# Patient Record
Sex: FEMALE | Race: WHITE | NOT HISPANIC OR LATINO
[De-identification: names, ages, dates, MRNs, and addresses within clinical notes are randomized per-mention and may not be internally consistent; named-entity substitution may affect disease eponyms.]

---

## 2017-10-01 ENCOUNTER — TRANSCRIPTION ENCOUNTER (OUTPATIENT)
Age: 42
End: 2017-10-01

## 2017-10-20 ENCOUNTER — OUTPATIENT (OUTPATIENT)
Dept: OUTPATIENT SERVICES | Facility: HOSPITAL | Age: 42
LOS: 1 days | End: 2017-10-20
Payer: COMMERCIAL

## 2017-10-20 ENCOUNTER — APPOINTMENT (OUTPATIENT)
Dept: OBGYN | Facility: CLINIC | Age: 42
End: 2017-10-20
Payer: COMMERCIAL

## 2017-10-20 VITALS
DIASTOLIC BLOOD PRESSURE: 68 MMHG | HEIGHT: 59 IN | RESPIRATION RATE: 20 BRPM | WEIGHT: 131.62 LBS | OXYGEN SATURATION: 100 % | HEART RATE: 82 BPM | TEMPERATURE: 98 F | SYSTOLIC BLOOD PRESSURE: 96 MMHG

## 2017-10-20 DIAGNOSIS — O02.1 MISSED ABORTION: ICD-10-CM

## 2017-10-20 DIAGNOSIS — Z01.818 ENCOUNTER FOR OTHER PREPROCEDURAL EXAMINATION: ICD-10-CM

## 2017-10-20 LAB
BLD GP AB SCN SERPL QL: NEGATIVE — SIGNIFICANT CHANGE UP
HCT VFR BLD CALC: 34.4 % — LOW (ref 34.5–45)
HGB BLD-MCNC: 11.6 G/DL — SIGNIFICANT CHANGE UP (ref 11.5–15.5)
MCHC RBC-ENTMCNC: 29.9 PG — SIGNIFICANT CHANGE UP (ref 27–34)
MCHC RBC-ENTMCNC: 33.7 GM/DL — SIGNIFICANT CHANGE UP (ref 32–36)
MCV RBC AUTO: 88.7 FL — SIGNIFICANT CHANGE UP (ref 80–100)
PLATELET # BLD AUTO: 233 K/UL — SIGNIFICANT CHANGE UP (ref 150–400)
RBC # BLD: 3.88 M/UL — SIGNIFICANT CHANGE UP (ref 3.8–5.2)
RBC # FLD: 12.5 % — SIGNIFICANT CHANGE UP (ref 10.3–14.5)
RH IG SCN BLD-IMP: POSITIVE — SIGNIFICANT CHANGE UP
WBC # BLD: 7.77 K/UL — SIGNIFICANT CHANGE UP (ref 3.8–10.5)
WBC # FLD AUTO: 7.77 K/UL — SIGNIFICANT CHANGE UP (ref 3.8–10.5)

## 2017-10-20 PROCEDURE — 86900 BLOOD TYPING SEROLOGIC ABO: CPT

## 2017-10-20 PROCEDURE — G0463: CPT

## 2017-10-20 PROCEDURE — 86850 RBC ANTIBODY SCREEN: CPT

## 2017-10-20 PROCEDURE — 99214 OFFICE O/P EST MOD 30 MIN: CPT

## 2017-10-20 PROCEDURE — 86901 BLOOD TYPING SEROLOGIC RH(D): CPT

## 2017-10-20 PROCEDURE — 85027 COMPLETE CBC AUTOMATED: CPT

## 2017-10-20 RX ORDER — ACETAMINOPHEN 500 MG
975 TABLET ORAL ONCE
Qty: 0 | Refills: 0 | Status: COMPLETED | OUTPATIENT
Start: 2017-10-23 | End: 2017-10-23

## 2017-10-20 RX ORDER — SODIUM CHLORIDE 9 MG/ML
3 INJECTION INTRAMUSCULAR; INTRAVENOUS; SUBCUTANEOUS EVERY 8 HOURS
Qty: 0 | Refills: 0 | Status: DISCONTINUED | OUTPATIENT
Start: 2017-10-23 | End: 2017-11-10

## 2017-10-20 RX ORDER — LIDOCAINE HCL 20 MG/ML
0.2 VIAL (ML) INJECTION ONCE
Qty: 0 | Refills: 0 | Status: DISCONTINUED | OUTPATIENT
Start: 2017-10-23 | End: 2017-11-10

## 2017-10-20 NOTE — H&P PST ADULT - RS GEN PE MLT RESP DETAILS PC
respirations non-labored/breath sounds equal/normal/airway patent/good air movement/clear to auscultation bilaterally

## 2017-10-20 NOTE — H&P PST ADULT - NSANTHOSAYNRD_GEN_A_CORE
No. MALI screening performed.  STOP BANG Legend: 0-2 = LOW Risk; 3-4 = INTERMEDIATE Risk; 5-8 = HIGH Risk

## 2017-10-23 ENCOUNTER — TRANSCRIPTION ENCOUNTER (OUTPATIENT)
Age: 42
End: 2017-10-23

## 2017-10-23 ENCOUNTER — OUTPATIENT (OUTPATIENT)
Dept: OUTPATIENT SERVICES | Facility: HOSPITAL | Age: 42
LOS: 1 days | End: 2017-10-23
Payer: COMMERCIAL

## 2017-10-23 ENCOUNTER — RESULT REVIEW (OUTPATIENT)
Age: 42
End: 2017-10-23

## 2017-10-23 VITALS
RESPIRATION RATE: 20 BRPM | SYSTOLIC BLOOD PRESSURE: 90 MMHG | HEART RATE: 74 BPM | OXYGEN SATURATION: 100 % | DIASTOLIC BLOOD PRESSURE: 62 MMHG | TEMPERATURE: 99 F | WEIGHT: 131.62 LBS | HEIGHT: 59 IN

## 2017-10-23 VITALS
OXYGEN SATURATION: 100 % | SYSTOLIC BLOOD PRESSURE: 92 MMHG | TEMPERATURE: 98 F | HEART RATE: 79 BPM | RESPIRATION RATE: 16 BRPM | DIASTOLIC BLOOD PRESSURE: 58 MMHG

## 2017-10-23 DIAGNOSIS — O02.1 MISSED ABORTION: ICD-10-CM

## 2017-10-23 DIAGNOSIS — Z01.818 ENCOUNTER FOR OTHER PREPROCEDURAL EXAMINATION: ICD-10-CM

## 2017-10-23 PROCEDURE — 88291 CYTO/MOLECULAR REPORT: CPT

## 2017-10-23 PROCEDURE — 59820 CARE OF MISCARRIAGE: CPT

## 2017-10-23 PROCEDURE — 88233 TISSUE CULTURE SKIN/BIOPSY: CPT

## 2017-10-23 PROCEDURE — 88305 TISSUE EXAM BY PATHOLOGIST: CPT

## 2017-10-23 PROCEDURE — 88264 CHROMOSOME ANALYSIS 20-25: CPT

## 2017-10-23 PROCEDURE — 88280 CHROMOSOME KARYOTYPE STUDY: CPT

## 2017-10-23 PROCEDURE — 88305 TISSUE EXAM BY PATHOLOGIST: CPT | Mod: 26

## 2017-10-23 RX ORDER — CELECOXIB 200 MG/1
200 CAPSULE ORAL ONCE
Qty: 0 | Refills: 0 | Status: COMPLETED | OUTPATIENT
Start: 2017-10-23 | End: 2017-10-23

## 2017-10-23 RX ORDER — SODIUM CHLORIDE 9 MG/ML
1000 INJECTION, SOLUTION INTRAVENOUS
Qty: 0 | Refills: 0 | Status: DISCONTINUED | OUTPATIENT
Start: 2017-10-23 | End: 2017-11-10

## 2017-10-23 RX ORDER — FAMOTIDINE 10 MG/ML
1 INJECTION INTRAVENOUS
Qty: 0 | Refills: 0 | COMMUNITY

## 2017-10-23 RX ORDER — OXYCODONE HYDROCHLORIDE 5 MG/1
5 TABLET ORAL ONCE
Qty: 0 | Refills: 0 | Status: DISCONTINUED | OUTPATIENT
Start: 2017-10-23 | End: 2017-10-23

## 2017-10-23 RX ORDER — ONDANSETRON 8 MG/1
4 TABLET, FILM COATED ORAL ONCE
Qty: 0 | Refills: 0 | Status: DISCONTINUED | OUTPATIENT
Start: 2017-10-23 | End: 2017-11-10

## 2017-10-23 RX ADMIN — CELECOXIB 200 MILLIGRAM(S): 200 CAPSULE ORAL at 09:24

## 2017-10-23 RX ADMIN — Medication 975 MILLIGRAM(S): at 09:24

## 2017-10-23 RX ADMIN — CELECOXIB 200 MILLIGRAM(S): 200 CAPSULE ORAL at 12:05

## 2017-10-23 NOTE — ASU DISCHARGE PLAN (ADULT/PEDIATRIC). - NOTIFY
Inability to Tolerate Liquids or Foods/Swelling that continues/Bleeding that does not stop/Numbness, color, or temperature change to extremity/Persistent Nausea and Vomiting/GYN Fever>100.4/Numbness, tingling/Unable to Urinate/Pain not relieved by Medications/Excessive Diarrhea/Increased Irritability or Sluggishness

## 2017-10-23 NOTE — ASU DISCHARGE PLAN (ADULT/PEDIATRIC). - MEDICATION SUMMARY - MEDICATIONS TO TAKE
I will START or STAY ON the medications listed below when I get home from the hospital:    Prenatal Multivitamins with Folic Acid 0.2 mg oral tablet  -- 1 tab(s) by mouth once a day  -- Indication: For vitamin    Synthroid 50 mcg (0.05 mg) oral tablet  -- 1 tab(s) by mouth 5 times a week  -- Indication: For hypothyroidism    Synthroid 100 mcg (0.1 mg) oral tablet  -- 1 tab(s) by mouth 2 times a week  -- Indication: For hypothyroidism    Vitamin D3 1000 intl units oral tablet  -- 1 tab(s) by mouth once a day  -- Indication: For vitamin

## 2017-10-23 NOTE — BRIEF OPERATIVE NOTE - PROCEDURE
<<-----Click on this checkbox to enter Procedure Dilation and curettage  10/23/2017    Active  ILBERT

## 2017-10-23 NOTE — ASU DISCHARGE PLAN (ADULT/PEDIATRIC). - MEDICATION SUMMARY - MEDICATIONS TO STOP TAKING
I will STOP taking the medications listed below when I get home from the hospital:    famotidine 20 mg oral tablet  -- 1 tab(s) by mouth once, 1 tab night prior to surgery, 1 tab day of surgery

## 2017-10-24 ENCOUNTER — APPOINTMENT (OUTPATIENT)
Dept: OBGYN | Facility: CLINIC | Age: 42
End: 2017-10-24

## 2017-10-24 LAB — SURGICAL PATHOLOGY STUDY: SIGNIFICANT CHANGE UP

## 2017-11-01 ENCOUNTER — APPOINTMENT (OUTPATIENT)
Dept: OBGYN | Facility: CLINIC | Age: 42
End: 2017-11-01
Payer: COMMERCIAL

## 2017-11-01 PROCEDURE — 99024 POSTOP FOLLOW-UP VISIT: CPT

## 2017-11-10 LAB — CHROM ANALY OVERALL INTERP SPEC-IMP: SIGNIFICANT CHANGE UP

## 2017-11-27 ENCOUNTER — APPOINTMENT (OUTPATIENT)
Dept: OBGYN | Facility: CLINIC | Age: 42
End: 2017-11-27
Payer: COMMERCIAL

## 2017-11-27 PROCEDURE — 96372 THER/PROPH/DIAG INJ SC/IM: CPT

## 2017-12-04 ENCOUNTER — APPOINTMENT (OUTPATIENT)
Dept: OBGYN | Facility: CLINIC | Age: 42
End: 2017-12-04
Payer: COMMERCIAL

## 2017-12-04 PROCEDURE — 36415 COLL VENOUS BLD VENIPUNCTURE: CPT

## 2017-12-09 NOTE — H&P PST ADULT - BSA (M2)
Discharge instructions reviewed with patient and mom. Prescription provided. All questions answered to patient's satisfaction. 1.54

## 2017-12-13 ENCOUNTER — RESULT REVIEW (OUTPATIENT)
Age: 42
End: 2017-12-13

## 2017-12-14 ENCOUNTER — APPOINTMENT (OUTPATIENT)
Dept: OBGYN | Facility: CLINIC | Age: 42
End: 2017-12-14
Payer: COMMERCIAL

## 2017-12-14 PROCEDURE — 99396 PREV VISIT EST AGE 40-64: CPT | Mod: 24

## 2017-12-14 PROCEDURE — 36415 COLL VENOUS BLD VENIPUNCTURE: CPT

## 2017-12-24 ENCOUNTER — EMERGENCY (EMERGENCY)
Facility: HOSPITAL | Age: 42
LOS: 1 days | Discharge: HOME | End: 2017-12-24
Attending: EMERGENCY MEDICINE | Admitting: EMERGENCY MEDICINE
Payer: COMMERCIAL

## 2017-12-24 VITALS
DIASTOLIC BLOOD PRESSURE: 76 MMHG | SYSTOLIC BLOOD PRESSURE: 108 MMHG | RESPIRATION RATE: 20 BRPM | HEART RATE: 101 BPM | TEMPERATURE: 98 F

## 2017-12-24 VITALS
RESPIRATION RATE: 16 BRPM | SYSTOLIC BLOOD PRESSURE: 90 MMHG | TEMPERATURE: 98 F | OXYGEN SATURATION: 100 % | DIASTOLIC BLOOD PRESSURE: 55 MMHG

## 2017-12-24 LAB
ALBUMIN SERPL ELPH-MCNC: 4.5 G/DL — SIGNIFICANT CHANGE UP (ref 3.3–5)
ALP SERPL-CCNC: 46 U/L — SIGNIFICANT CHANGE UP (ref 40–120)
ALT FLD-CCNC: 17 U/L RC — SIGNIFICANT CHANGE UP (ref 10–45)
ANION GAP SERPL CALC-SCNC: 11 MMOL/L — SIGNIFICANT CHANGE UP (ref 5–17)
APPEARANCE UR: CLEAR — SIGNIFICANT CHANGE UP
APTT BLD: 26.8 SEC — LOW (ref 27.5–37.4)
AST SERPL-CCNC: 19 U/L — SIGNIFICANT CHANGE UP (ref 10–40)
BACTERIA # UR AUTO: ABNORMAL /HPF
BASE EXCESS BLDV CALC-SCNC: -0.4 MMOL/L — SIGNIFICANT CHANGE UP (ref -2–2)
BASOPHILS # BLD AUTO: 0 K/UL — SIGNIFICANT CHANGE UP (ref 0–0.2)
BASOPHILS NFR BLD AUTO: 0.3 % — SIGNIFICANT CHANGE UP (ref 0–2)
BILIRUB SERPL-MCNC: 0.5 MG/DL — SIGNIFICANT CHANGE UP (ref 0.2–1.2)
BILIRUB UR-MCNC: NEGATIVE — SIGNIFICANT CHANGE UP
BUN SERPL-MCNC: 13 MG/DL — SIGNIFICANT CHANGE UP (ref 7–23)
CA-I SERPL-SCNC: 1.22 MMOL/L — SIGNIFICANT CHANGE UP (ref 1.12–1.3)
CALCIUM SERPL-MCNC: 9.1 MG/DL — SIGNIFICANT CHANGE UP (ref 8.4–10.5)
CHLORIDE BLDV-SCNC: 107 MMOL/L — SIGNIFICANT CHANGE UP (ref 96–108)
CHLORIDE SERPL-SCNC: 105 MMOL/L — SIGNIFICANT CHANGE UP (ref 96–108)
CO2 BLDV-SCNC: 26 MMOL/L — SIGNIFICANT CHANGE UP (ref 22–30)
CO2 SERPL-SCNC: 25 MMOL/L — SIGNIFICANT CHANGE UP (ref 22–31)
COLOR SPEC: YELLOW — SIGNIFICANT CHANGE UP
CREAT SERPL-MCNC: 0.67 MG/DL — SIGNIFICANT CHANGE UP (ref 0.5–1.3)
DIFF PNL FLD: ABNORMAL
EOSINOPHIL # BLD AUTO: 0.1 K/UL — SIGNIFICANT CHANGE UP (ref 0–0.5)
EOSINOPHIL NFR BLD AUTO: 0.6 % — SIGNIFICANT CHANGE UP (ref 0–6)
EPI CELLS # UR: SIGNIFICANT CHANGE UP /HPF
GAS PNL BLDV: 138 MMOL/L — SIGNIFICANT CHANGE UP (ref 136–145)
GAS PNL BLDV: SIGNIFICANT CHANGE UP
GAS PNL BLDV: SIGNIFICANT CHANGE UP
GLUCOSE BLDV-MCNC: 98 MG/DL — SIGNIFICANT CHANGE UP (ref 70–99)
GLUCOSE SERPL-MCNC: 101 MG/DL — HIGH (ref 70–99)
GLUCOSE UR QL: NEGATIVE — SIGNIFICANT CHANGE UP
HCO3 BLDV-SCNC: 25 MMOL/L — SIGNIFICANT CHANGE UP (ref 21–29)
HCT VFR BLD CALC: 39 % — SIGNIFICANT CHANGE UP (ref 34.5–45)
HCT VFR BLDA CALC: 40 % — SIGNIFICANT CHANGE UP (ref 39–50)
HGB BLD CALC-MCNC: 13.1 G/DL — SIGNIFICANT CHANGE UP (ref 11.5–15.5)
HGB BLD-MCNC: 13.5 G/DL — SIGNIFICANT CHANGE UP (ref 11.5–15.5)
INR BLD: 0.96 RATIO — SIGNIFICANT CHANGE UP (ref 0.88–1.16)
KETONES UR-MCNC: NEGATIVE — SIGNIFICANT CHANGE UP
LACTATE BLDV-MCNC: 1.3 MMOL/L — SIGNIFICANT CHANGE UP (ref 0.7–2)
LEUKOCYTE ESTERASE UR-ACNC: NEGATIVE — SIGNIFICANT CHANGE UP
LYMPHOCYTES # BLD AUTO: 1.5 K/UL — SIGNIFICANT CHANGE UP (ref 1–3.3)
LYMPHOCYTES # BLD AUTO: 14.6 % — SIGNIFICANT CHANGE UP (ref 13–44)
MCHC RBC-ENTMCNC: 32.1 PG — SIGNIFICANT CHANGE UP (ref 27–34)
MCHC RBC-ENTMCNC: 34.6 GM/DL — SIGNIFICANT CHANGE UP (ref 32–36)
MCV RBC AUTO: 92.9 FL — SIGNIFICANT CHANGE UP (ref 80–100)
MONOCYTES # BLD AUTO: 0.7 K/UL — SIGNIFICANT CHANGE UP (ref 0–0.9)
MONOCYTES NFR BLD AUTO: 7.2 % — SIGNIFICANT CHANGE UP (ref 2–14)
NEUTROPHILS # BLD AUTO: 8 K/UL — HIGH (ref 1.8–7.4)
NEUTROPHILS NFR BLD AUTO: 77.3 % — HIGH (ref 43–77)
NITRITE UR-MCNC: NEGATIVE — SIGNIFICANT CHANGE UP
PCO2 BLDV: 47 MMHG — SIGNIFICANT CHANGE UP (ref 35–50)
PH BLDV: 7.35 — SIGNIFICANT CHANGE UP (ref 7.35–7.45)
PH UR: 7 — SIGNIFICANT CHANGE UP (ref 5–8)
PLATELET # BLD AUTO: 220 K/UL — SIGNIFICANT CHANGE UP (ref 150–400)
PO2 BLDV: 39 MMHG — SIGNIFICANT CHANGE UP (ref 25–45)
POTASSIUM BLDV-SCNC: 3.7 MMOL/L — SIGNIFICANT CHANGE UP (ref 3.5–5)
POTASSIUM SERPL-MCNC: 4 MMOL/L — SIGNIFICANT CHANGE UP (ref 3.5–5.3)
POTASSIUM SERPL-SCNC: 4 MMOL/L — SIGNIFICANT CHANGE UP (ref 3.5–5.3)
PROT SERPL-MCNC: 7.2 G/DL — SIGNIFICANT CHANGE UP (ref 6–8.3)
PROT UR-MCNC: NEGATIVE — SIGNIFICANT CHANGE UP
PROTHROM AB SERPL-ACNC: 10.4 SEC — SIGNIFICANT CHANGE UP (ref 9.8–12.7)
RBC # BLD: 4.2 M/UL — SIGNIFICANT CHANGE UP (ref 3.8–5.2)
RBC # FLD: 11.5 % — SIGNIFICANT CHANGE UP (ref 10.3–14.5)
SAO2 % BLDV: 69 % — SIGNIFICANT CHANGE UP (ref 67–88)
SODIUM SERPL-SCNC: 141 MMOL/L — SIGNIFICANT CHANGE UP (ref 135–145)
SP GR SPEC: 1.02 — SIGNIFICANT CHANGE UP (ref 1.01–1.02)
UROBILINOGEN FLD QL: NEGATIVE — SIGNIFICANT CHANGE UP
WBC # BLD: 10.3 K/UL — SIGNIFICANT CHANGE UP (ref 3.8–10.5)
WBC # FLD AUTO: 10.3 K/UL — SIGNIFICANT CHANGE UP (ref 3.8–10.5)
WBC UR QL: SIGNIFICANT CHANGE UP /HPF (ref 0–5)

## 2017-12-24 PROCEDURE — 82947 ASSAY GLUCOSE BLOOD QUANT: CPT

## 2017-12-24 PROCEDURE — 74177 CT ABD & PELVIS W/CONTRAST: CPT | Mod: 26

## 2017-12-24 PROCEDURE — 85027 COMPLETE CBC AUTOMATED: CPT

## 2017-12-24 PROCEDURE — 99284 EMERGENCY DEPT VISIT MOD MDM: CPT | Mod: 25

## 2017-12-24 PROCEDURE — 84295 ASSAY OF SERUM SODIUM: CPT

## 2017-12-24 PROCEDURE — 80053 COMPREHEN METABOLIC PANEL: CPT

## 2017-12-24 PROCEDURE — 85730 THROMBOPLASTIN TIME PARTIAL: CPT

## 2017-12-24 PROCEDURE — 74177 CT ABD & PELVIS W/CONTRAST: CPT

## 2017-12-24 PROCEDURE — 96374 THER/PROPH/DIAG INJ IV PUSH: CPT | Mod: XU

## 2017-12-24 PROCEDURE — 87086 URINE CULTURE/COLONY COUNT: CPT

## 2017-12-24 PROCEDURE — 82330 ASSAY OF CALCIUM: CPT

## 2017-12-24 PROCEDURE — 82435 ASSAY OF BLOOD CHLORIDE: CPT

## 2017-12-24 PROCEDURE — 84132 ASSAY OF SERUM POTASSIUM: CPT

## 2017-12-24 PROCEDURE — 83605 ASSAY OF LACTIC ACID: CPT

## 2017-12-24 PROCEDURE — 85610 PROTHROMBIN TIME: CPT

## 2017-12-24 PROCEDURE — 81001 URINALYSIS AUTO W/SCOPE: CPT

## 2017-12-24 PROCEDURE — 99285 EMERGENCY DEPT VISIT HI MDM: CPT | Mod: 25

## 2017-12-24 PROCEDURE — 82803 BLOOD GASES ANY COMBINATION: CPT

## 2017-12-24 PROCEDURE — 85014 HEMATOCRIT: CPT

## 2017-12-24 RX ORDER — SODIUM CHLORIDE 9 MG/ML
1000 INJECTION INTRAMUSCULAR; INTRAVENOUS; SUBCUTANEOUS ONCE
Qty: 0 | Refills: 0 | Status: COMPLETED | OUTPATIENT
Start: 2017-12-24 | End: 2017-12-24

## 2017-12-24 RX ORDER — ACETAMINOPHEN 500 MG
1000 TABLET ORAL ONCE
Qty: 0 | Refills: 0 | Status: COMPLETED | OUTPATIENT
Start: 2017-12-24 | End: 2017-12-24

## 2017-12-24 RX ADMIN — Medication 400 MILLIGRAM(S): at 09:00

## 2017-12-24 RX ADMIN — SODIUM CHLORIDE 1000 MILLILITER(S): 9 INJECTION INTRAMUSCULAR; INTRAVENOUS; SUBCUTANEOUS at 09:00

## 2017-12-24 RX ADMIN — Medication 1000 MILLIGRAM(S): at 09:30

## 2017-12-24 NOTE — ED PROVIDER NOTE - GASTROINTESTINAL NEGATIVE STATEMENT, MLM
no abdominal pain, + pelvic cramping, no bloating, no constipation, no diarrhea, no nausea and no vomiting.

## 2017-12-24 NOTE — ED ADULT NURSE REASSESSMENT NOTE - NS ED NURSE REASSESS COMMENT FT1
patient is refusing CT and refuses to drink oral contrast. Patient educated regarding CT exam, risks and benefits of procedure. MD Lozano made aware.
Patient reported 7/10 left suprapubic abdominal pain, pt given IV tylenol and reports 2/10 pain.

## 2017-12-24 NOTE — ED PROVIDER NOTE - PHYSICAL EXAMINATION
Gen. no acute distress, Non toxic   HEENT: EOMI, mmm,   Lungs: CTAB/L no C/ W /R   CVS: S1S2   Abd; Soft diffusely tender, more tender on LLQ. no guarding, non distended   Ext: no edema   Neuro AAOx3 non focal clear speech

## 2017-12-24 NOTE — ED ADULT NURSE NOTE - CHPI ED SYMPTOMS NEG
no blood in stool/no diarrhea/no burning urination/no nausea/no vomiting/no fever/no dysuria/no abdominal distension/no chills

## 2017-12-24 NOTE — ED PROVIDER NOTE - OBJECTIVE STATEMENT
43 y/o f with pmhx thyroid dis currently getting fertility tx presents for abd pain described as cramping lower abd pan. began yesterday. Worsening thru the day. no fever. no chills. pain is episodic, lasting minutes. resolves on its own. no back pain. pain more on left side. no associated hematuria. + diarrhea /soft bm. no dysuria. no cough. no chills.

## 2017-12-24 NOTE — ED PROVIDER NOTE - ATTENDING CONTRIBUTION TO CARE
41 y/o f with pmhx thyroid dis currently getting fertility tx presents for abd pain described as cramping lower abd pan. began yesterday. Worsening thru the day. no fever. no chills. pain is episodic, lasting minutes. resolves on its own. no back pain. pain more on left side. no associated hematuria. + diarrhea /soft bm. no dysuria. no cough. no chills.   Gen. no acute distress, Non toxic   HEENT: EOMI, mmm,   Lungs: CTAB/L no C/ W /R   CVS: S1S2   Abd; Soft diffusely tender, more tender on LLQ. no guarding, non distended   Ext: no edema   Neuro AAOx3 non focal clear speech Gen. no acute distress, Non toxic   HEENT: EOMI, mmm,   Lungs: CTAB/L no C/ W /R   CVS: S1S2   Abd; Soft diffusely tender, more tender on LLQ. no guarding, non distended   Ext: no edema   Neuro AAOx3 non focal clear speech

## 2017-12-24 NOTE — ED ADULT NURSE NOTE - OBJECTIVE STATEMENT
41 YO  female with no PMH as per patient, but sp DNC 10/23 via walk in presenting with   for amount of time as per .    Pt denies urinary symptoms. Last BM:     .   Pt Axox4, gross neuro intact, PERRL mm. Lungs clear throughout bilateral. S1S2 heard. Abdomen soft, non-tender, non-distended. Skin warm, dry, and intact. Safety and comfort measures maintained. family present at bedside. 41 YO  female with no PMH as per patient, but sp DNC 10/23 via walk in presenting with left suprapubic abdominal pain that radiates to the back that started yesterday. Pt reports sitting worsens the pain and walking relieves the pain. Pt reports recent DNC on 10/23/2017, no menstrual period since, however pt reports that patient was ovulated and expecting period soon.   Pt Axox4, gross neuro intact, PERRL 4 mm. Lungs clear throughout bilateral. S1S2 heard. Abdomen soft, non-tender, non-distended. Skin warm, dry, and intact. Safety and comfort measures maintained. family present at bedside.

## 2017-12-25 LAB
CULTURE RESULTS: SIGNIFICANT CHANGE UP
SPECIMEN SOURCE: SIGNIFICANT CHANGE UP

## 2018-05-21 ENCOUNTER — APPOINTMENT (OUTPATIENT)
Dept: OBGYN | Facility: CLINIC | Age: 43
End: 2018-05-21
Payer: COMMERCIAL

## 2018-05-21 PROCEDURE — 76817 TRANSVAGINAL US OBSTETRIC: CPT

## 2018-05-21 PROCEDURE — 0500F INITIAL PRENATAL CARE VISIT: CPT

## 2018-05-21 PROCEDURE — 36415 COLL VENOUS BLD VENIPUNCTURE: CPT

## 2018-06-04 ENCOUNTER — APPOINTMENT (OUTPATIENT)
Dept: OBGYN | Facility: CLINIC | Age: 43
End: 2018-06-04
Payer: COMMERCIAL

## 2018-06-04 PROCEDURE — 0502F SUBSEQUENT PRENATAL CARE: CPT

## 2018-06-13 ENCOUNTER — APPOINTMENT (OUTPATIENT)
Dept: OBGYN | Facility: CLINIC | Age: 43
End: 2018-06-13
Payer: COMMERCIAL

## 2018-06-13 PROCEDURE — 0502F SUBSEQUENT PRENATAL CARE: CPT

## 2018-06-13 PROCEDURE — 76813 OB US NUCHAL MEAS 1 GEST: CPT

## 2018-06-13 PROCEDURE — 36415 COLL VENOUS BLD VENIPUNCTURE: CPT

## 2018-06-21 ENCOUNTER — OUTPATIENT (OUTPATIENT)
Dept: OUTPATIENT SERVICES | Facility: HOSPITAL | Age: 43
LOS: 1 days | Discharge: HOME | End: 2018-06-21

## 2018-06-21 DIAGNOSIS — D68.9 COAGULATION DEFECT, UNSPECIFIED: ICD-10-CM

## 2018-06-25 ENCOUNTER — APPOINTMENT (OUTPATIENT)
Dept: OBGYN | Facility: CLINIC | Age: 43
End: 2018-06-25
Payer: COMMERCIAL

## 2018-06-25 PROCEDURE — 0502F SUBSEQUENT PRENATAL CARE: CPT

## 2018-06-26 ENCOUNTER — RESULT REVIEW (OUTPATIENT)
Age: 43
End: 2018-06-26

## 2018-06-26 ENCOUNTER — APPOINTMENT (OUTPATIENT)
Dept: HEMATOLOGY ONCOLOGY | Facility: CLINIC | Age: 43
End: 2018-06-26
Payer: COMMERCIAL

## 2018-06-26 VITALS
TEMPERATURE: 98.3 F | RESPIRATION RATE: 16 BRPM | WEIGHT: 138.01 LBS | DIASTOLIC BLOOD PRESSURE: 68 MMHG | HEIGHT: 59.21 IN | HEART RATE: 74 BPM | OXYGEN SATURATION: 100 % | BODY MASS INDEX: 27.82 KG/M2 | SYSTOLIC BLOOD PRESSURE: 101 MMHG

## 2018-06-26 DIAGNOSIS — O99.119 OTHER DISEASES OF THE BLOOD AND BLOOD-FORMING ORGANS AND CERTAIN DISORDERS INVOLVING THE IMMUNE MECHANISM COMPLICATING PREGNANCY, UNSPECIFIED TRIMESTER: ICD-10-CM

## 2018-06-26 DIAGNOSIS — Z34.90 ENCOUNTER FOR SUPERVISION OF NORMAL PREGNANCY, UNSPECIFIED, UNSPECIFIED TRIMESTER: ICD-10-CM

## 2018-06-26 DIAGNOSIS — E03.9 ENDOCRINE, NUTRITIONAL AND METABOLIC DISEASES COMPLICATING PREGNANCY, UNSPECIFIED TRIMESTER: ICD-10-CM

## 2018-06-26 DIAGNOSIS — D68.59 OTHER DISEASES OF THE BLOOD AND BLOOD-FORMING ORGANS AND CERTAIN DISORDERS INVOLVING THE IMMUNE MECHANISM COMPLICATING PREGNANCY, UNSPECIFIED TRIMESTER: ICD-10-CM

## 2018-06-26 DIAGNOSIS — Z80.3 FAMILY HISTORY OF MALIGNANT NEOPLASM OF BREAST: ICD-10-CM

## 2018-06-26 DIAGNOSIS — Z78.9 OTHER SPECIFIED HEALTH STATUS: ICD-10-CM

## 2018-06-26 DIAGNOSIS — O99.280 ENDOCRINE, NUTRITIONAL AND METABOLIC DISEASES COMPLICATING PREGNANCY, UNSPECIFIED TRIMESTER: ICD-10-CM

## 2018-06-26 LAB
BASOPHILS # BLD AUTO: 0 K/UL — SIGNIFICANT CHANGE UP (ref 0–0.2)
BASOPHILS NFR BLD AUTO: 0.3 % — SIGNIFICANT CHANGE UP (ref 0–2)
EOSINOPHIL # BLD AUTO: 0.2 K/UL — SIGNIFICANT CHANGE UP (ref 0–0.5)
EOSINOPHIL NFR BLD AUTO: 2 % — SIGNIFICANT CHANGE UP (ref 0–6)
HCT VFR BLD CALC: 35.7 % — SIGNIFICANT CHANGE UP (ref 34.5–45)
HGB BLD-MCNC: 12.6 G/DL — SIGNIFICANT CHANGE UP (ref 11.5–15.5)
LYMPHOCYTES # BLD AUTO: 1.7 K/UL — SIGNIFICANT CHANGE UP (ref 1–3.3)
LYMPHOCYTES # BLD AUTO: 21.3 % — SIGNIFICANT CHANGE UP (ref 13–44)
MCHC RBC-ENTMCNC: 31.7 PG — SIGNIFICANT CHANGE UP (ref 27–34)
MCHC RBC-ENTMCNC: 35.2 G/DL — SIGNIFICANT CHANGE UP (ref 32–36)
MCV RBC AUTO: 90.1 FL — SIGNIFICANT CHANGE UP (ref 80–100)
MONOCYTES # BLD AUTO: 0.6 K/UL — SIGNIFICANT CHANGE UP (ref 0–0.9)
MONOCYTES NFR BLD AUTO: 7.5 % — SIGNIFICANT CHANGE UP (ref 2–14)
NEUTROPHILS # BLD AUTO: 5.6 K/UL — SIGNIFICANT CHANGE UP (ref 1.8–7.4)
NEUTROPHILS NFR BLD AUTO: 69 % — SIGNIFICANT CHANGE UP (ref 43–77)
PLATELET # BLD AUTO: 210 K/UL — SIGNIFICANT CHANGE UP (ref 150–400)
RBC # BLD: 3.96 M/UL — SIGNIFICANT CHANGE UP (ref 3.8–5.2)
RBC # FLD: 11.4 % — SIGNIFICANT CHANGE UP (ref 10.3–14.5)
WBC # BLD: 8.2 K/UL — SIGNIFICANT CHANGE UP (ref 3.8–10.5)
WBC # FLD AUTO: 8.2 K/UL — SIGNIFICANT CHANGE UP (ref 3.8–10.5)

## 2018-06-26 PROCEDURE — 99245 OFF/OP CONSLTJ NEW/EST HI 55: CPT

## 2018-06-26 RX ORDER — NICOTINE 11MG/24HR
50 MCG PATCH, TRANSDERMAL 24 HOURS TRANSDERMAL DAILY
Refills: 0 | Status: ACTIVE | COMMUNITY

## 2018-06-26 RX ORDER — ASPIRIN 81 MG/1
81 TABLET, CHEWABLE ORAL DAILY
Refills: 0 | Status: ACTIVE | COMMUNITY

## 2018-06-26 RX ORDER — LEVOTHYROXINE SODIUM 50 UG/1
50 TABLET ORAL
Refills: 0 | Status: ACTIVE | COMMUNITY

## 2018-06-26 RX ORDER — PNV NO.95/FERROUS FUM/FOLIC AC 28MG-0.8MG
TABLET ORAL DAILY
Refills: 0 | Status: ACTIVE | COMMUNITY

## 2018-07-12 ENCOUNTER — APPOINTMENT (OUTPATIENT)
Dept: OBGYN | Facility: CLINIC | Age: 43
End: 2018-07-12
Payer: COMMERCIAL

## 2018-07-12 PROCEDURE — 0502F SUBSEQUENT PRENATAL CARE: CPT

## 2018-07-12 PROCEDURE — 36415 COLL VENOUS BLD VENIPUNCTURE: CPT

## 2018-07-30 ENCOUNTER — OUTPATIENT (OUTPATIENT)
Dept: OUTPATIENT SERVICES | Age: 43
LOS: 1 days | Discharge: HOME | End: 2018-07-30

## 2018-07-30 PROBLEM — E03.9 HYPOTHYROIDISM, UNSPECIFIED: Chronic | Status: ACTIVE | Noted: 2017-10-20

## 2018-07-30 PROBLEM — J30.2 OTHER SEASONAL ALLERGIC RHINITIS: Chronic | Status: ACTIVE | Noted: 2017-10-20

## 2018-07-30 PROBLEM — O03.9 COMPLETE OR UNSPECIFIED SPONTANEOUS ABORTION WITHOUT COMPLICATION: Chronic | Status: ACTIVE | Noted: 2017-12-24

## 2018-08-03 ENCOUNTER — ASOB RESULT (OUTPATIENT)
Age: 43
End: 2018-08-03

## 2018-08-03 ENCOUNTER — APPOINTMENT (OUTPATIENT)
Dept: ANTEPARTUM | Facility: CLINIC | Age: 43
End: 2018-08-03
Payer: COMMERCIAL

## 2018-08-03 PROCEDURE — 76817 TRANSVAGINAL US OBSTETRIC: CPT | Mod: 59

## 2018-08-03 PROCEDURE — 76811 OB US DETAILED SNGL FETUS: CPT

## 2018-08-03 PROCEDURE — 99241 OFFICE CONSULTATION NEW/ESTAB PATIENT 15 MIN: CPT | Mod: 25

## 2018-08-09 ENCOUNTER — APPOINTMENT (OUTPATIENT)
Dept: OBGYN | Facility: CLINIC | Age: 43
End: 2018-08-09
Payer: COMMERCIAL

## 2018-08-09 ENCOUNTER — APPOINTMENT (OUTPATIENT)
Dept: PEDIATRIC CARDIOLOGY | Facility: CLINIC | Age: 43
End: 2018-08-09
Payer: COMMERCIAL

## 2018-08-09 PROCEDURE — 76825 ECHO EXAM OF FETAL HEART: CPT

## 2018-08-09 PROCEDURE — 0502F SUBSEQUENT PRENATAL CARE: CPT

## 2018-08-09 PROCEDURE — 93325 DOPPLER ECHO COLOR FLOW MAPG: CPT

## 2018-08-09 PROCEDURE — 76827 ECHO EXAM OF FETAL HEART: CPT

## 2018-09-05 ENCOUNTER — APPOINTMENT (OUTPATIENT)
Dept: OBGYN | Facility: CLINIC | Age: 43
End: 2018-09-05
Payer: COMMERCIAL

## 2018-09-05 PROCEDURE — 0502F SUBSEQUENT PRENATAL CARE: CPT

## 2018-09-24 ENCOUNTER — APPOINTMENT (OUTPATIENT)
Dept: OBGYN | Facility: CLINIC | Age: 43
End: 2018-09-24
Payer: COMMERCIAL

## 2018-09-24 PROCEDURE — 90472 IMMUNIZATION ADMIN EACH ADD: CPT

## 2018-09-24 PROCEDURE — 90715 TDAP VACCINE 7 YRS/> IM: CPT

## 2018-09-24 PROCEDURE — 90471 IMMUNIZATION ADMIN: CPT

## 2018-09-24 PROCEDURE — 36415 COLL VENOUS BLD VENIPUNCTURE: CPT

## 2018-09-24 PROCEDURE — 90686 IIV4 VACC NO PRSV 0.5 ML IM: CPT

## 2018-10-15 ENCOUNTER — APPOINTMENT (OUTPATIENT)
Dept: OBGYN | Facility: CLINIC | Age: 43
End: 2018-10-15
Payer: COMMERCIAL

## 2018-10-15 PROCEDURE — 76816 OB US FOLLOW-UP PER FETUS: CPT

## 2018-10-15 PROCEDURE — 76819 FETAL BIOPHYS PROFIL W/O NST: CPT

## 2018-10-15 PROCEDURE — 0502F SUBSEQUENT PRENATAL CARE: CPT

## 2018-10-31 ENCOUNTER — APPOINTMENT (OUTPATIENT)
Dept: OBGYN | Facility: CLINIC | Age: 43
End: 2018-10-31
Payer: COMMERCIAL

## 2018-10-31 PROCEDURE — 0502F SUBSEQUENT PRENATAL CARE: CPT

## 2018-11-14 ENCOUNTER — APPOINTMENT (OUTPATIENT)
Dept: OBGYN | Facility: CLINIC | Age: 43
End: 2018-11-14
Payer: COMMERCIAL

## 2018-11-14 PROCEDURE — 76816 OB US FOLLOW-UP PER FETUS: CPT

## 2018-11-14 PROCEDURE — 0502F SUBSEQUENT PRENATAL CARE: CPT

## 2018-11-20 ENCOUNTER — APPOINTMENT (OUTPATIENT)
Dept: OBGYN | Facility: CLINIC | Age: 43
End: 2018-11-20
Payer: COMMERCIAL

## 2018-11-20 PROCEDURE — 0502F SUBSEQUENT PRENATAL CARE: CPT

## 2018-11-27 ENCOUNTER — APPOINTMENT (OUTPATIENT)
Dept: OBGYN | Facility: CLINIC | Age: 43
End: 2018-11-27
Payer: COMMERCIAL

## 2018-11-27 PROCEDURE — 59025 FETAL NON-STRESS TEST: CPT

## 2018-11-27 PROCEDURE — 0502F SUBSEQUENT PRENATAL CARE: CPT

## 2018-12-04 ENCOUNTER — OUTPATIENT (OUTPATIENT)
Dept: OUTPATIENT SERVICES | Facility: HOSPITAL | Age: 43
LOS: 1 days | End: 2018-12-04
Payer: COMMERCIAL

## 2018-12-04 ENCOUNTER — APPOINTMENT (OUTPATIENT)
Dept: OBGYN | Facility: CLINIC | Age: 43
End: 2018-12-04
Payer: COMMERCIAL

## 2018-12-04 DIAGNOSIS — O09.512 SUPERVISION OF ELDERLY PRIMIGRAVIDA, SECOND TRIMESTER: ICD-10-CM

## 2018-12-04 DIAGNOSIS — Z01.818 ENCOUNTER FOR OTHER PREPROCEDURAL EXAMINATION: ICD-10-CM

## 2018-12-04 LAB
BLD GP AB SCN SERPL QL: NEGATIVE — SIGNIFICANT CHANGE UP
HCT VFR BLD CALC: 34.2 % — LOW (ref 34.5–45)
HGB BLD-MCNC: 11 G/DL — LOW (ref 11.5–15.5)
MCHC RBC-ENTMCNC: 29.5 PG — SIGNIFICANT CHANGE UP (ref 27–34)
MCHC RBC-ENTMCNC: 32.2 GM/DL — SIGNIFICANT CHANGE UP (ref 32–36)
MCV RBC AUTO: 91.7 FL — SIGNIFICANT CHANGE UP (ref 80–100)
PLATELET # BLD AUTO: 218 K/UL — SIGNIFICANT CHANGE UP (ref 150–400)
RBC # BLD: 3.73 M/UL — LOW (ref 3.8–5.2)
RBC # FLD: 13.5 % — SIGNIFICANT CHANGE UP (ref 10.3–14.5)
RH IG SCN BLD-IMP: POSITIVE — SIGNIFICANT CHANGE UP
WBC # BLD: 9.11 K/UL — SIGNIFICANT CHANGE UP (ref 3.8–10.5)
WBC # FLD AUTO: 9.11 K/UL — SIGNIFICANT CHANGE UP (ref 3.8–10.5)

## 2018-12-04 PROCEDURE — 86901 BLOOD TYPING SEROLOGIC RH(D): CPT

## 2018-12-04 PROCEDURE — 86900 BLOOD TYPING SEROLOGIC ABO: CPT

## 2018-12-04 PROCEDURE — 76818 FETAL BIOPHYS PROFILE W/NST: CPT

## 2018-12-04 PROCEDURE — 0502F SUBSEQUENT PRENATAL CARE: CPT

## 2018-12-04 PROCEDURE — 85027 COMPLETE CBC AUTOMATED: CPT

## 2018-12-04 PROCEDURE — 76816 OB US FOLLOW-UP PER FETUS: CPT

## 2018-12-04 PROCEDURE — 86850 RBC ANTIBODY SCREEN: CPT

## 2018-12-04 PROCEDURE — G0463: CPT

## 2018-12-04 RX ORDER — LEVOTHYROXINE SODIUM 125 MCG
1 TABLET ORAL
Qty: 0 | Refills: 0 | COMMUNITY

## 2018-12-04 RX ORDER — CHOLECALCIFEROL (VITAMIN D3) 125 MCG
1 CAPSULE ORAL
Qty: 0 | Refills: 0 | COMMUNITY

## 2018-12-10 ENCOUNTER — APPOINTMENT (OUTPATIENT)
Dept: OBGYN | Facility: CLINIC | Age: 43
End: 2018-12-10
Payer: COMMERCIAL

## 2018-12-10 PROCEDURE — 76819 FETAL BIOPHYS PROFIL W/O NST: CPT

## 2018-12-10 PROCEDURE — 0502F SUBSEQUENT PRENATAL CARE: CPT

## 2018-12-12 ENCOUNTER — APPOINTMENT (OUTPATIENT)
Dept: OBGYN | Facility: CLINIC | Age: 43
End: 2018-12-12

## 2018-12-15 ENCOUNTER — TRANSCRIPTION ENCOUNTER (OUTPATIENT)
Age: 43
End: 2018-12-15

## 2018-12-16 ENCOUNTER — INPATIENT (INPATIENT)
Facility: HOSPITAL | Age: 43
LOS: 2 days | Discharge: HOME | End: 2018-12-19
Attending: OBSTETRICS & GYNECOLOGY | Admitting: OBSTETRICS & GYNECOLOGY
Payer: COMMERCIAL

## 2018-12-16 VITALS — HEIGHT: 59 IN | WEIGHT: 138.89 LBS

## 2018-12-16 DIAGNOSIS — Z3A.00 WEEKS OF GESTATION OF PREGNANCY NOT SPECIFIED: ICD-10-CM

## 2018-12-16 DIAGNOSIS — O26.899 OTHER SPECIFIED PREGNANCY RELATED CONDITIONS, UNSPECIFIED TRIMESTER: ICD-10-CM

## 2018-12-16 DIAGNOSIS — Z34.80 ENCOUNTER FOR SUPERVISION OF OTHER NORMAL PREGNANCY, UNSPECIFIED TRIMESTER: ICD-10-CM

## 2018-12-16 LAB
BASOPHILS # BLD AUTO: 0.1 K/UL — SIGNIFICANT CHANGE UP (ref 0–0.2)
BASOPHILS NFR BLD AUTO: 0.6 % — SIGNIFICANT CHANGE UP (ref 0–2)
BLD GP AB SCN SERPL QL: NEGATIVE — SIGNIFICANT CHANGE UP
EOSINOPHIL # BLD AUTO: 0.2 K/UL — SIGNIFICANT CHANGE UP (ref 0–0.5)
EOSINOPHIL NFR BLD AUTO: 2.1 % — SIGNIFICANT CHANGE UP (ref 0–6)
HCT VFR BLD CALC: 35.6 % — SIGNIFICANT CHANGE UP (ref 34.5–45)
HGB BLD-MCNC: 12.3 G/DL — SIGNIFICANT CHANGE UP (ref 11.5–15.5)
LYMPHOCYTES # BLD AUTO: 2 K/UL — SIGNIFICANT CHANGE UP (ref 1–3.3)
LYMPHOCYTES # BLD AUTO: 21.5 % — SIGNIFICANT CHANGE UP (ref 13–44)
MCHC RBC-ENTMCNC: 31.1 PG — SIGNIFICANT CHANGE UP (ref 27–34)
MCHC RBC-ENTMCNC: 34.6 GM/DL — SIGNIFICANT CHANGE UP (ref 32–36)
MCV RBC AUTO: 90 FL — SIGNIFICANT CHANGE UP (ref 80–100)
MONOCYTES # BLD AUTO: 0.7 K/UL — SIGNIFICANT CHANGE UP (ref 0–0.9)
MONOCYTES NFR BLD AUTO: 7.9 % — SIGNIFICANT CHANGE UP (ref 2–14)
NEUTROPHILS # BLD AUTO: 6.3 K/UL — SIGNIFICANT CHANGE UP (ref 1.8–7.4)
NEUTROPHILS NFR BLD AUTO: 67.8 % — SIGNIFICANT CHANGE UP (ref 43–77)
PLATELET # BLD AUTO: 215 K/UL — SIGNIFICANT CHANGE UP (ref 150–400)
RBC # BLD: 3.96 M/UL — SIGNIFICANT CHANGE UP (ref 3.8–5.2)
RBC # FLD: 11.7 % — SIGNIFICANT CHANGE UP (ref 10.3–14.5)
RH IG SCN BLD-IMP: POSITIVE — SIGNIFICANT CHANGE UP
WBC # BLD: 9.3 K/UL — SIGNIFICANT CHANGE UP (ref 3.8–10.5)
WBC # FLD AUTO: 9.3 K/UL — SIGNIFICANT CHANGE UP (ref 3.8–10.5)

## 2018-12-16 PROCEDURE — 59510 CESAREAN DELIVERY: CPT

## 2018-12-16 RX ORDER — OXYTOCIN 10 UNIT/ML
41.67 VIAL (ML) INJECTION
Qty: 20 | Refills: 0 | Status: DISCONTINUED | OUTPATIENT
Start: 2018-12-16 | End: 2018-12-19

## 2018-12-16 RX ORDER — LANOLIN
1 OINTMENT (GRAM) TOPICAL
Qty: 0 | Refills: 0 | Status: DISCONTINUED | OUTPATIENT
Start: 2018-12-16 | End: 2018-12-19

## 2018-12-16 RX ORDER — SODIUM CHLORIDE 9 MG/ML
1000 INJECTION, SOLUTION INTRAVENOUS
Qty: 0 | Refills: 0 | Status: DISCONTINUED | OUTPATIENT
Start: 2018-12-16 | End: 2018-12-19

## 2018-12-16 RX ORDER — CITRIC ACID/SODIUM CITRATE 300-500 MG
30 SOLUTION, ORAL ORAL ONCE
Qty: 0 | Refills: 0 | Status: COMPLETED | OUTPATIENT
Start: 2018-12-16 | End: 2018-12-16

## 2018-12-16 RX ORDER — DOCUSATE SODIUM 100 MG
100 CAPSULE ORAL
Qty: 0 | Refills: 0 | Status: DISCONTINUED | OUTPATIENT
Start: 2018-12-16 | End: 2018-12-19

## 2018-12-16 RX ORDER — DIPHENHYDRAMINE HCL 50 MG
25 CAPSULE ORAL EVERY 6 HOURS
Qty: 0 | Refills: 0 | Status: DISCONTINUED | OUTPATIENT
Start: 2018-12-16 | End: 2018-12-19

## 2018-12-16 RX ORDER — SIMETHICONE 80 MG/1
80 TABLET, CHEWABLE ORAL EVERY 4 HOURS
Qty: 0 | Refills: 0 | Status: DISCONTINUED | OUTPATIENT
Start: 2018-12-16 | End: 2018-12-19

## 2018-12-16 RX ORDER — OXYCODONE HYDROCHLORIDE 5 MG/1
5 TABLET ORAL
Qty: 0 | Refills: 0 | Status: COMPLETED | OUTPATIENT
Start: 2018-12-18 | End: 2018-12-25

## 2018-12-16 RX ORDER — TETANUS TOXOID, REDUCED DIPHTHERIA TOXOID AND ACELLULAR PERTUSSIS VACCINE, ADSORBED 5; 2.5; 8; 8; 2.5 [IU]/.5ML; [IU]/.5ML; UG/.5ML; UG/.5ML; UG/.5ML
0.5 SUSPENSION INTRAMUSCULAR ONCE
Qty: 0 | Refills: 0 | Status: DISCONTINUED | OUTPATIENT
Start: 2018-12-18 | End: 2018-12-19

## 2018-12-16 RX ORDER — ACETAMINOPHEN 500 MG
975 TABLET ORAL EVERY 6 HOURS
Qty: 0 | Refills: 0 | Status: DISCONTINUED | OUTPATIENT
Start: 2018-12-16 | End: 2018-12-19

## 2018-12-16 RX ORDER — FERROUS SULFATE 325(65) MG
325 TABLET ORAL DAILY
Qty: 0 | Refills: 0 | Status: DISCONTINUED | OUTPATIENT
Start: 2018-12-16 | End: 2018-12-17

## 2018-12-16 RX ORDER — GLYCERIN ADULT
1 SUPPOSITORY, RECTAL RECTAL AT BEDTIME
Qty: 0 | Refills: 0 | Status: DISCONTINUED | OUTPATIENT
Start: 2018-12-16 | End: 2018-12-19

## 2018-12-16 RX ORDER — ONDANSETRON 8 MG/1
4 TABLET, FILM COATED ORAL EVERY 6 HOURS
Qty: 0 | Refills: 0 | Status: DISCONTINUED | OUTPATIENT
Start: 2018-12-16 | End: 2018-12-17

## 2018-12-16 RX ORDER — LEVOTHYROXINE SODIUM 125 MCG
50 TABLET ORAL DAILY
Qty: 0 | Refills: 0 | Status: DISCONTINUED | OUTPATIENT
Start: 2018-12-16 | End: 2018-12-19

## 2018-12-16 RX ORDER — SODIUM CHLORIDE 9 MG/ML
1000 INJECTION, SOLUTION INTRAVENOUS
Qty: 0 | Refills: 0 | Status: DISCONTINUED | OUTPATIENT
Start: 2018-12-18 | End: 2018-12-19

## 2018-12-16 RX ORDER — KETOROLAC TROMETHAMINE 30 MG/ML
30 SYRINGE (ML) INJECTION EVERY 6 HOURS
Qty: 0 | Refills: 0 | Status: DISCONTINUED | OUTPATIENT
Start: 2018-12-16 | End: 2018-12-18

## 2018-12-16 RX ORDER — NALOXONE HYDROCHLORIDE 4 MG/.1ML
0.1 SPRAY NASAL
Qty: 0 | Refills: 0 | Status: DISCONTINUED | OUTPATIENT
Start: 2018-12-16 | End: 2018-12-17

## 2018-12-16 RX ORDER — DEXAMETHASONE 0.5 MG/5ML
4 ELIXIR ORAL EVERY 6 HOURS
Qty: 0 | Refills: 0 | Status: DISCONTINUED | OUTPATIENT
Start: 2018-12-16 | End: 2018-12-17

## 2018-12-16 RX ORDER — HEPARIN SODIUM 5000 [USP'U]/ML
5000 INJECTION INTRAVENOUS; SUBCUTANEOUS EVERY 12 HOURS
Qty: 0 | Refills: 0 | Status: DISCONTINUED | OUTPATIENT
Start: 2018-12-16 | End: 2018-12-19

## 2018-12-16 RX ORDER — OXYTOCIN 10 UNIT/ML
333.33 VIAL (ML) INJECTION
Qty: 20 | Refills: 0 | Status: DISCONTINUED | OUTPATIENT
Start: 2018-12-18 | End: 2018-12-19

## 2018-12-16 RX ORDER — SODIUM CHLORIDE 9 MG/ML
1000 INJECTION, SOLUTION INTRAVENOUS ONCE
Qty: 0 | Refills: 0 | Status: COMPLETED | OUTPATIENT
Start: 2018-12-16 | End: 2018-12-16

## 2018-12-16 RX ORDER — OXYCODONE HYDROCHLORIDE 5 MG/1
5 TABLET ORAL EVERY 4 HOURS
Qty: 0 | Refills: 0 | Status: DISCONTINUED | OUTPATIENT
Start: 2018-12-18 | End: 2018-12-19

## 2018-12-16 RX ORDER — METOCLOPRAMIDE HCL 10 MG
10 TABLET ORAL ONCE
Qty: 0 | Refills: 0 | Status: DISCONTINUED | OUTPATIENT
Start: 2018-12-16 | End: 2018-12-19

## 2018-12-16 RX ORDER — OXYTOCIN 10 UNIT/ML
41.67 VIAL (ML) INJECTION
Qty: 20 | Refills: 0 | Status: DISCONTINUED | OUTPATIENT
Start: 2018-12-18 | End: 2018-12-19

## 2018-12-16 RX ORDER — FAMOTIDINE 10 MG/ML
20 INJECTION INTRAVENOUS ONCE
Qty: 0 | Refills: 0 | Status: COMPLETED | OUTPATIENT
Start: 2018-12-16 | End: 2018-12-16

## 2018-12-16 RX ORDER — IBUPROFEN 200 MG
600 TABLET ORAL EVERY 6 HOURS
Qty: 0 | Refills: 0 | Status: COMPLETED | OUTPATIENT
Start: 2018-12-16 | End: 2019-11-14

## 2018-12-16 RX ADMIN — Medication 975 MILLIGRAM(S): at 17:33

## 2018-12-16 RX ADMIN — Medication 975 MILLIGRAM(S): at 23:41

## 2018-12-16 RX ADMIN — Medication 30 MILLILITER(S): at 07:18

## 2018-12-16 RX ADMIN — FAMOTIDINE 20 MILLIGRAM(S): 10 INJECTION INTRAVENOUS at 07:16

## 2018-12-16 RX ADMIN — Medication 975 MILLIGRAM(S): at 16:55

## 2018-12-16 RX ADMIN — SODIUM CHLORIDE 2000 MILLILITER(S): 9 INJECTION, SOLUTION INTRAVENOUS at 05:43

## 2018-12-16 RX ADMIN — HEPARIN SODIUM 5000 UNIT(S): 5000 INJECTION INTRAVENOUS; SUBCUTANEOUS at 16:55

## 2018-12-17 LAB
BASOPHILS # BLD AUTO: 0.02 K/UL — SIGNIFICANT CHANGE UP (ref 0–0.2)
BASOPHILS NFR BLD AUTO: 0.2 % — SIGNIFICANT CHANGE UP (ref 0–2)
EOSINOPHIL # BLD AUTO: 0.14 K/UL — SIGNIFICANT CHANGE UP (ref 0–0.5)
EOSINOPHIL NFR BLD AUTO: 1.4 % — SIGNIFICANT CHANGE UP (ref 0–6)
HCT VFR BLD CALC: 29.4 % — LOW (ref 34.5–45)
HGB BLD-MCNC: 9.7 G/DL — LOW (ref 11.5–15.5)
IMM GRANULOCYTES NFR BLD AUTO: 0.3 % — SIGNIFICANT CHANGE UP (ref 0–1.5)
LYMPHOCYTES # BLD AUTO: 1.74 K/UL — SIGNIFICANT CHANGE UP (ref 1–3.3)
LYMPHOCYTES # BLD AUTO: 17.6 % — SIGNIFICANT CHANGE UP (ref 13–44)
MCHC RBC-ENTMCNC: 30.2 PG — SIGNIFICANT CHANGE UP (ref 27–34)
MCHC RBC-ENTMCNC: 33 GM/DL — SIGNIFICANT CHANGE UP (ref 32–36)
MCV RBC AUTO: 91.6 FL — SIGNIFICANT CHANGE UP (ref 80–100)
MONOCYTES # BLD AUTO: 0.82 K/UL — SIGNIFICANT CHANGE UP (ref 0–0.9)
MONOCYTES NFR BLD AUTO: 8.3 % — SIGNIFICANT CHANGE UP (ref 2–14)
NEUTROPHILS # BLD AUTO: 7.15 K/UL — SIGNIFICANT CHANGE UP (ref 1.8–7.4)
NEUTROPHILS NFR BLD AUTO: 72.2 % — SIGNIFICANT CHANGE UP (ref 43–77)
PLATELET # BLD AUTO: 189 K/UL — SIGNIFICANT CHANGE UP (ref 150–400)
RBC # BLD: 3.21 M/UL — LOW (ref 3.8–5.2)
RBC # FLD: 13.7 % — SIGNIFICANT CHANGE UP (ref 10.3–14.5)
T PALLIDUM AB TITR SER: NEGATIVE — SIGNIFICANT CHANGE UP
WBC # BLD: 9.9 K/UL — SIGNIFICANT CHANGE UP (ref 3.8–10.5)
WBC # FLD AUTO: 9.9 K/UL — SIGNIFICANT CHANGE UP (ref 3.8–10.5)

## 2018-12-17 RX ORDER — ASCORBIC ACID 60 MG
500 TABLET,CHEWABLE ORAL
Qty: 0 | Refills: 0 | Status: DISCONTINUED | OUTPATIENT
Start: 2018-12-17 | End: 2018-12-19

## 2018-12-17 RX ORDER — IBUPROFEN 200 MG
600 TABLET ORAL EVERY 6 HOURS
Qty: 0 | Refills: 0 | Status: DISCONTINUED | OUTPATIENT
Start: 2018-12-17 | End: 2018-12-19

## 2018-12-17 RX ORDER — FERROUS SULFATE 325(65) MG
325 TABLET ORAL
Qty: 0 | Refills: 0 | Status: DISCONTINUED | OUTPATIENT
Start: 2018-12-17 | End: 2018-12-19

## 2018-12-17 RX ADMIN — Medication 600 MILLIGRAM(S): at 16:30

## 2018-12-17 RX ADMIN — Medication 1 TABLET(S): at 12:04

## 2018-12-17 RX ADMIN — Medication 500 MILLIGRAM(S): at 19:14

## 2018-12-17 RX ADMIN — Medication 600 MILLIGRAM(S): at 23:05

## 2018-12-17 RX ADMIN — Medication 600 MILLIGRAM(S): at 15:59

## 2018-12-17 RX ADMIN — HEPARIN SODIUM 5000 UNIT(S): 5000 INJECTION INTRAVENOUS; SUBCUTANEOUS at 05:33

## 2018-12-17 RX ADMIN — Medication 975 MILLIGRAM(S): at 00:10

## 2018-12-17 RX ADMIN — Medication 600 MILLIGRAM(S): at 23:45

## 2018-12-17 RX ADMIN — HEPARIN SODIUM 5000 UNIT(S): 5000 INJECTION INTRAVENOUS; SUBCUTANEOUS at 19:15

## 2018-12-17 RX ADMIN — Medication 975 MILLIGRAM(S): at 05:33

## 2018-12-17 RX ADMIN — Medication 975 MILLIGRAM(S): at 12:35

## 2018-12-17 RX ADMIN — Medication 975 MILLIGRAM(S): at 06:00

## 2018-12-17 RX ADMIN — Medication 50 MICROGRAM(S): at 05:33

## 2018-12-17 RX ADMIN — Medication 975 MILLIGRAM(S): at 12:04

## 2018-12-17 RX ADMIN — Medication 325 MILLIGRAM(S): at 19:14

## 2018-12-18 ENCOUNTER — TRANSCRIPTION ENCOUNTER (OUTPATIENT)
Age: 43
End: 2018-12-18

## 2018-12-18 RX ORDER — OXYCODONE HYDROCHLORIDE 5 MG/1
5 TABLET ORAL
Qty: 0 | Refills: 0 | Status: DISCONTINUED | OUTPATIENT
Start: 2018-12-18 | End: 2018-12-19

## 2018-12-18 RX ADMIN — Medication 500 MILLIGRAM(S): at 06:39

## 2018-12-18 RX ADMIN — Medication 1 TABLET(S): at 12:54

## 2018-12-18 RX ADMIN — HEPARIN SODIUM 5000 UNIT(S): 5000 INJECTION INTRAVENOUS; SUBCUTANEOUS at 06:40

## 2018-12-18 RX ADMIN — Medication 600 MILLIGRAM(S): at 06:30

## 2018-12-18 RX ADMIN — Medication 975 MILLIGRAM(S): at 16:19

## 2018-12-18 RX ADMIN — Medication 975 MILLIGRAM(S): at 22:03

## 2018-12-18 RX ADMIN — Medication 975 MILLIGRAM(S): at 10:38

## 2018-12-18 RX ADMIN — Medication 975 MILLIGRAM(S): at 11:10

## 2018-12-18 RX ADMIN — HEPARIN SODIUM 5000 UNIT(S): 5000 INJECTION INTRAVENOUS; SUBCUTANEOUS at 17:09

## 2018-12-18 RX ADMIN — Medication 600 MILLIGRAM(S): at 13:25

## 2018-12-18 RX ADMIN — Medication 100 MILLIGRAM(S): at 17:09

## 2018-12-18 RX ADMIN — Medication 325 MILLIGRAM(S): at 06:38

## 2018-12-18 RX ADMIN — Medication 975 MILLIGRAM(S): at 04:15

## 2018-12-18 RX ADMIN — Medication 975 MILLIGRAM(S): at 22:35

## 2018-12-18 RX ADMIN — Medication 600 MILLIGRAM(S): at 12:54

## 2018-12-18 RX ADMIN — Medication 975 MILLIGRAM(S): at 16:50

## 2018-12-18 RX ADMIN — Medication 600 MILLIGRAM(S): at 18:40

## 2018-12-18 RX ADMIN — Medication 100 MILLIGRAM(S): at 06:46

## 2018-12-18 RX ADMIN — Medication 600 MILLIGRAM(S): at 18:09

## 2018-12-18 RX ADMIN — Medication 975 MILLIGRAM(S): at 03:25

## 2018-12-18 RX ADMIN — Medication 500 MILLIGRAM(S): at 17:08

## 2018-12-18 RX ADMIN — Medication 50 MICROGRAM(S): at 06:40

## 2018-12-18 RX ADMIN — Medication 325 MILLIGRAM(S): at 17:08

## 2018-12-18 NOTE — DISCHARGE NOTE OB - CARE PLAN
Principal Discharge DX:	 delivery delivered  Goal:	recover from surgery  Assessment and plan of treatment:	nothing in the vagina, no heavy lifting or exercise, follow up in office in 1-2 weeks

## 2018-12-18 NOTE — DISCHARGE NOTE OB - MATERIALS PROVIDED
[facility] [place] Screening Program/Breastfeeding Mother’s Support Group Information/Guide to Postpartum Care/Breastfeeding Log/Birth Certificate Instructions/Breastfeeding Guide and Packet/[facility] Hearing Screen Program New Beginnings/Shaken Baby Prevention Handout/[facility]  Screening Program/Back To Sleep Handout/Birth Certificate Instructions/[facility] Hearing Screen Program

## 2018-12-18 NOTE — DISCHARGE NOTE OB - PLAN OF CARE
recover from surgery nothing in the vagina, no heavy lifting or exercise, follow up in office in 1-2 weeks

## 2018-12-18 NOTE — DISCHARGE NOTE OB - PATIENT PORTAL LINK FT
You can access the [facility] Portal, offered by [facility], by registering with the following website: [email or web address]

## 2018-12-18 NOTE — DISCHARGE NOTE OB - MEDICATION SUMMARY - MEDICATIONS TO TAKE
I will START or STAY ON the medications listed below when I get home from the hospital:    acetaminophen 325 mg oral tablet  -- 3 tab(s) by mouth every 6 hours  -- Indication: For mild pain    ibuprofen 600 mg oral tablet  -- 1 tab(s) by mouth every 6 hours  -- Indication: For moderate pain    Prenatal Multivitamins with Folic Acid 0.2 mg oral tablet  -- 1 tab(s) by mouth once a day  -- Indication: For nutrition    docusate sodium 100 mg oral capsule  -- 1 cap(s) by mouth 2 times a day, As needed, Stool Softening  -- Indication: For Stool softener    simethicone 80 mg oral tablet, chewable  -- 1 tab(s) by mouth every 4 hours, As needed, Gas  -- Indication: For gas pain    Synthroid 50 mcg (0.05 mg) oral tablet  -- 1 tab(s) by mouth 5 times a week  -- Indication: For hypothyroidism    Synthroid 100 mcg (0.1 mg) oral tablet  -- 1 tab(s) by mouth 2 times a week  -- Indication: For hypothyroidism    Vitamin D3 1000 intl units oral tablet  -- 1 tab(s) by mouth once a day  -- Indication: For nutrition

## 2018-12-18 NOTE — DISCHARGE NOTE OB - CARE PROVIDER_API CALL
Kaela Abad (MD), OBSGYN  [address] [address]  Phone: [phone]  Fax: [phone]

## 2018-12-19 ENCOUNTER — APPOINTMENT (OUTPATIENT)
Dept: OBGYN | Facility: CLINIC | Age: 43
End: 2018-12-19

## 2018-12-19 VITALS
TEMPERATURE: 98 F | DIASTOLIC BLOOD PRESSURE: 71 MMHG | HEART RATE: 65 BPM | SYSTOLIC BLOOD PRESSURE: 112 MMHG | RESPIRATION RATE: 18 BRPM

## 2018-12-19 LAB
BASOPHILS # BLD AUTO: 0.01 K/UL — SIGNIFICANT CHANGE UP (ref 0–0.2)
BASOPHILS NFR BLD AUTO: 0.2 % — SIGNIFICANT CHANGE UP (ref 0–2)
EOSINOPHIL # BLD AUTO: 0.3 K/UL — SIGNIFICANT CHANGE UP (ref 0–0.5)
EOSINOPHIL NFR BLD AUTO: 4.6 % — SIGNIFICANT CHANGE UP (ref 0–6)
HCT VFR BLD CALC: 29.6 % — LOW (ref 34.5–45)
HGB BLD-MCNC: 9.8 G/DL — LOW (ref 11.5–15.5)
IMM GRANULOCYTES NFR BLD AUTO: 0.3 % — SIGNIFICANT CHANGE UP (ref 0–1.5)
LYMPHOCYTES # BLD AUTO: 2 K/UL — SIGNIFICANT CHANGE UP (ref 1–3.3)
LYMPHOCYTES # BLD AUTO: 30.9 % — SIGNIFICANT CHANGE UP (ref 13–44)
MCHC RBC-ENTMCNC: 30.2 PG — SIGNIFICANT CHANGE UP (ref 27–34)
MCHC RBC-ENTMCNC: 33.1 GM/DL — SIGNIFICANT CHANGE UP (ref 32–36)
MCV RBC AUTO: 91.4 FL — SIGNIFICANT CHANGE UP (ref 80–100)
MONOCYTES # BLD AUTO: 0.57 K/UL — SIGNIFICANT CHANGE UP (ref 0–0.9)
MONOCYTES NFR BLD AUTO: 8.8 % — SIGNIFICANT CHANGE UP (ref 2–14)
NEUTROPHILS # BLD AUTO: 3.58 K/UL — SIGNIFICANT CHANGE UP (ref 1.8–7.4)
NEUTROPHILS NFR BLD AUTO: 55.2 % — SIGNIFICANT CHANGE UP (ref 43–77)
PLATELET # BLD AUTO: 223 K/UL — SIGNIFICANT CHANGE UP (ref 150–400)
RBC # BLD: 3.24 M/UL — LOW (ref 3.8–5.2)
RBC # FLD: 13.4 % — SIGNIFICANT CHANGE UP (ref 10.3–14.5)
WBC # BLD: 6.48 K/UL — SIGNIFICANT CHANGE UP (ref 3.8–10.5)
WBC # FLD AUTO: 6.48 K/UL — SIGNIFICANT CHANGE UP (ref 3.8–10.5)

## 2018-12-19 PROCEDURE — G0463: CPT

## 2018-12-19 PROCEDURE — 85027 COMPLETE CBC AUTOMATED: CPT

## 2018-12-19 PROCEDURE — 86780 TREPONEMA PALLIDUM: CPT

## 2018-12-19 PROCEDURE — 59050 FETAL MONITOR W/REPORT: CPT

## 2018-12-19 PROCEDURE — 59025 FETAL NON-STRESS TEST: CPT

## 2018-12-19 RX ORDER — IBUPROFEN 200 MG
1 TABLET ORAL
Qty: 0 | Refills: 0 | COMMUNITY
Start: 2018-12-19

## 2018-12-19 RX ORDER — SIMETHICONE 80 MG/1
1 TABLET, CHEWABLE ORAL
Qty: 0 | Refills: 0 | COMMUNITY
Start: 2018-12-19

## 2018-12-19 RX ORDER — ACETAMINOPHEN 500 MG
3 TABLET ORAL
Qty: 0 | Refills: 0 | COMMUNITY
Start: 2018-12-19

## 2018-12-19 RX ORDER — DOCUSATE SODIUM 100 MG
1 CAPSULE ORAL
Qty: 0 | Refills: 0 | COMMUNITY
Start: 2018-12-19

## 2018-12-19 RX ADMIN — Medication 500 MILLIGRAM(S): at 05:14

## 2018-12-19 RX ADMIN — Medication 600 MILLIGRAM(S): at 12:10

## 2018-12-19 RX ADMIN — Medication 50 MICROGRAM(S): at 05:14

## 2018-12-19 RX ADMIN — Medication 1 TABLET(S): at 12:11

## 2018-12-19 RX ADMIN — Medication 100 MILLIGRAM(S): at 05:14

## 2018-12-19 RX ADMIN — HEPARIN SODIUM 5000 UNIT(S): 5000 INJECTION INTRAVENOUS; SUBCUTANEOUS at 05:14

## 2018-12-19 RX ADMIN — Medication 600 MILLIGRAM(S): at 05:45

## 2018-12-19 RX ADMIN — Medication 600 MILLIGRAM(S): at 05:13

## 2018-12-19 NOTE — PROGRESS NOTE ADULT - PROBLEM SELECTOR PLAN 1
- Continue motrin, tylenol, oxycodone PRN for pain control.  - Increase ambulation  - Continue regular diet  - Discharge planning  - F/U AM DAMIR Merchant, PGY-1
- Continue regular diet.  - Increase ambulation.  - Continue PCEA for pain.  - F/U AM CBC    Aishwarya Merchant, PGY-1
- Continue regular diet.  - Increase ambulation.  - Continue motrin, tylenol, oxycodone PRN for pain control.     Aishwarya Merchant, PGY-1

## 2018-12-19 NOTE — PROGRESS NOTE ADULT - ASSESSMENT
A/P: 43y POD#1 s/p LTCS.  Patient is stable and doing well post-operatively.
A/P: 43y POD#2 s/p LTCS.  Patient is stable and doing well post-operatively.
A/P: 43y POD#3 s/p LTCS.  Patient is stable and is doing well post-operatively.

## 2018-12-19 NOTE — PROGRESS NOTE ADULT - SUBJECTIVE AND OBJECTIVE BOX
Day 1 of Anesthesia Pain Management Service    SUBJECTIVE: I'm okay  Pain Scale Score:    [X] Refer to charted pain scores    THERAPY: Epidural Bupivacaine 0.01 % and Fentanyl 3 micrograms/mL     Demand Dose: 3 mL  Lockout: 15 minutes   Continuous Rate:  10 mL    MEDICATIONS  (STANDING):  acetaminophen   Tablet .. 975 milliGRAM(s) Oral every 6 hours  ferrous    sulfate 325 milliGRAM(s) Oral daily  heparin  Injectable 5000 Unit(s) SubCutaneous every 12 hours  ibuprofen  Tablet. 600 milliGRAM(s) Oral every 6 hours  ketorolac   Injectable 30 milliGRAM(s) IV Push every 6 hours  lactated ringers. 1000 milliLiter(s) (125 mL/Hr) IV Continuous <Continuous>  lactated ringers. 1000 milliLiter(s) (125 mL/Hr) IV Continuous <Continuous>  levothyroxine 50 MICROGram(s) Oral daily  oxytocin Infusion 41.667 milliUNIT(s)/Min (125 mL/Hr) IV Continuous <Continuous>  prenatal multivitamin 1 Tablet(s) Oral daily    MEDICATIONS  (PRN):  diphenhydrAMINE 25 milliGRAM(s) Oral every 6 hours PRN Itching  docusate sodium 100 milliGRAM(s) Oral two times a day PRN Stool Softening  glycerin Suppository - Adult 1 Suppository(s) Rectal at bedtime PRN Constipation  lanolin Ointment 1 Application(s) Topical every 3 hours PRN Sore Nipples  metoclopramide Injectable 10 milliGRAM(s) IV Push once PRN Nausea and/or Vomiting  simethicone 80 milliGRAM(s) Chew every 4 hours PRN Gas      OBJECTIVE:    Assessment of Epidural Catheter Site: 	    [ ] Dressing intact	[X] Site non-tender	[X] Site without erythema, discharge, edema  [ ] Epidural tubing and connection checked	[X] Gross neurological exam within normal limits  [X] Catheter removed                          12.3   9.3   )-----------( 215      ( 16 Dec 2018 05:58 )             35.6     Vital Signs Last 24 Hrs  T(C): 36.9 (12-17-18 @ 06:00), Max: 37.4 (12-17-18 @ 00:41)  T(F): 98.4 (12-17-18 @ 06:00), Max: 99.3 (12-17-18 @ 00:41)  HR: 74 (12-17-18 @ 06:00) (74 - 106)  BP: 95/62 (12-17-18 @ 06:00) (91/61 - 134/71)  BP(mean): 74 (12-16-18 @ 11:15) (74 - 84)  RR: 18 (12-17-18 @ 06:00) (18 - 18)  SpO2: 96% (12-16-18 @ 17:12) (96% - 100%)      Sedation Score:	[X] Alert	[ ] Drowsy	[ ] Arousable  [ ] Asleep  [ ] Unresponsive    Side Effects:	[X] None	[ ] Nausea	[ ] Vomiting  [ ] Pruritus  		[ ] Weakness  [ ] Numbness  [ ] Other:    ASSESSMENT/ PLAN:    Therapy:                         [ ] Continue   [X] Discontinue   [X] Change to PRN Analgesics    Documentation and Verification of current medications:  [X] Done	[ ] Not done, not eligible, reason:    Comments:
OB Progress Note:  Delivery, POD#1    S: 43y POD#1 s/p LTCS . No acute overnight events. No acute complaints, pain is well controlled. She is tolerating a regular diet and not yet passing flatus. Denies N/V, CP, SOB, lightheadedness, dizziness.   Ambulating without difficulty  Patient is voiding spontaneously.      O:   Vital Signs Last 24 Hrs  T(C): 36.9 (17 Dec 2018 06:00), Max: 37.4 (17 Dec 2018 00:41)  T(F): 98.4 (17 Dec 2018 06:00), Max: 99.3 (17 Dec 2018 00:41)  HR: 74 (17 Dec 2018 06:00) (74 - 106)  BP: 95/62 (17 Dec 2018 06:00) (91/61 - 134/71)  BP(mean): 74 (16 Dec 2018 11:15) (74 - 84)  RR: 18 (17 Dec 2018 06:00) (18 - 18)  SpO2: 96% (16 Dec 2018 17:12) (96% - 100%)    18 @ 07:01  -  18 @ 07:00  --------------------------------------------------------  IN: 0.65 mL/kg/hr / OUT: 1.58 mL/kg/hr / NET: -0.93 mL/kg/hr        PE:  General: NAD  Pulm: Clear to auscultation bilaterally  Cardio: Regular rate and normal rhythm  Abdomen: Mildly distended, appropriately tender, firm uterine fundus   Incision: clean, dry, intact w/ steri strips   Extremities: No erythema, no pitting edema, non-tender    Labs:  Blood Type: AB Positive  Antibody Screen: Negative  RPR: Negative                            12.3   9.3   )-----------( 215      ( 16 Dec 2018 05:58 )             35.6                 Medications:  MEDICATIONS  (STANDING):  acetaminophen   Tablet .. 975 milliGRAM(s) Oral every 6 hours  fentaNYL (3 MICROgram(s)/mL) + BUpivacaine 0.01% in 0.9% Sodium Chloride PCEA 250 milliLiter(s) Epidural PCA Continuous  ferrous    sulfate 325 milliGRAM(s) Oral daily  heparin  Injectable 5000 Unit(s) SubCutaneous every 12 hours  ibuprofen  Tablet. 600 milliGRAM(s) Oral every 6 hours  ketorolac   Injectable 30 milliGRAM(s) IV Push every 6 hours  lactated ringers. 1000 milliLiter(s) (125 mL/Hr) IV Continuous <Continuous>  lactated ringers. 1000 milliLiter(s) (125 mL/Hr) IV Continuous <Continuous>  levothyroxine 50 MICROGram(s) Oral daily  oxytocin Infusion 41.667 milliUNIT(s)/Min (125 mL/Hr) IV Continuous <Continuous>  prenatal multivitamin 1 Tablet(s) Oral daily    MEDICATIONS  (PRN):  dexamethasone  Injectable 4 milliGRAM(s) IV Push every 6 hours PRN Nausea, IF ondansetron is ineffective after 30 - 60 minutes  diphenhydrAMINE 25 milliGRAM(s) Oral every 6 hours PRN Itching  docusate sodium 100 milliGRAM(s) Oral two times a day PRN Stool Softening  fentaNYL (3 MICROgram(s)/mL) + BUpivacaine 0.01% in 0.9% Sodium Chloride PCEA Rescue [facility] Bolus 5 milliLiter(s) Epidural every 15 minutes PRN Moderate Pain (4 - 6)  glycerin Suppository - Adult 1 Suppository(s) Rectal at bedtime PRN Constipation  lanolin Ointment 1 Application(s) Topical every 3 hours PRN Sore Nipples  metoclopramide Injectable 10 milliGRAM(s) IV Push once PRN Nausea and/or Vomiting  naloxone Injectable 0.1 milliGRAM(s) IV Push every 3 minutes PRN For ANY of the following changes in patient status:  A. RR LESS THAN 10 breaths per minute, B. Oxygen saturation LESS THAN 90%, C. Sedation score of 6  ondansetron Injectable 4 milliGRAM(s) IV Push every 6 hours PRN Nausea  simethicone 80 milliGRAM(s) Chew every 4 hours PRN Gas
OB Progress Note:  Delivery, POD#2    S: 43y POD#2 s/p LTCS. No acute overnight events. No acute complaints, pain is well controlled. She is tolerating a regular diet and passing flatus. She is voiding spontaneously and ambulating without difficulty. Denies CP, SOB, lightheadedness, dizziness. Denies N/V.    O:  Vitals:  Vital Signs Last 24 Hrs  T(C): 36.9 (18 Dec 2018 06:02), Max: 36.9 (18 Dec 2018 06:02)  T(F): 98.4 (18 Dec 2018 06:02), Max: 98.4 (18 Dec 2018 06:02)  HR: 70 (18 Dec 2018 06:02) (70 - 85)  BP: 110/75 (18 Dec 2018 06:02) (93/65 - 110/75)  RR: 18 (18 Dec 2018 06:02) (18 - 18)      PE:  General: NAD  Abdomen: Mildly distended, appropriately tender, firm uterine fundus   Incision: clean, dry, intact w/ steri strips   Extremities: No erythema, no pitting edema, non-tender    Labs:  Blood Type: AB Positive  Antibody Screen: Negative  RPR: Negative                            9.7    9.90  )-----------( 189      ( 17 Dec 2018 09:18 )             29.4                 Medications:  MEDICATIONS  (STANDING):  acetaminophen   Tablet .. 975 milliGRAM(s) Oral every 6 hours  ascorbic acid 500 milliGRAM(s) Oral two times a day  diphtheria/tetanus/pertussis (acellular) Vaccine (ADAcel) 0.5 milliLiter(s) IntraMuscular once  ferrous    sulfate 325 milliGRAM(s) Oral two times a day  heparin  Injectable 5000 Unit(s) SubCutaneous every 12 hours  ibuprofen  Tablet. 600 milliGRAM(s) Oral every 6 hours  ketorolac   Injectable 30 milliGRAM(s) IV Push every 6 hours  lactated ringers. 1000 milliLiter(s) (125 mL/Hr) IV Continuous <Continuous>  lactated ringers. 1000 milliLiter(s) (125 mL/Hr) IV Continuous <Continuous>  lactated ringers. 1000 milliLiter(s) (75 mL/Hr) IV Continuous <Continuous>  levothyroxine 50 MICROGram(s) Oral daily  oxyCODONE    IR 5 milliGRAM(s) Oral every 3 hours  oxytocin Infusion 333.333 milliUNIT(s)/Min (1000 mL/Hr) IV Continuous <Continuous>  oxytocin Infusion 41.667 milliUNIT(s)/Min (125 mL/Hr) IV Continuous <Continuous>  oxytocin Infusion 41.667 milliUNIT(s)/Min (125 mL/Hr) IV Continuous <Continuous>  prenatal multivitamin 1 Tablet(s) Oral daily    MEDICATIONS  (PRN):  diphenhydrAMINE 25 milliGRAM(s) Oral every 6 hours PRN Itching  docusate sodium 100 milliGRAM(s) Oral two times a day PRN Stool Softening  glycerin Suppository - Adult 1 Suppository(s) Rectal at bedtime PRN Constipation  lanolin Ointment 1 Application(s) Topical every 3 hours PRN Sore Nipples  metoclopramide Injectable 10 milliGRAM(s) IV Push once PRN Nausea and/or Vomiting  oxyCODONE    IR 5 milliGRAM(s) Oral every 4 hours PRN Severe Pain (7 - 10)  simethicone 80 milliGRAM(s) Chew every 4 hours PRN Gas
Pain Management Attending Addendum    SUBJECTIVE: Patient doing well with PCEA    Therapy:    [X] PCEA    OBJECTIVE:   [X] Pain appropriately controlled    [ ] Other:    Side Effects:  [X] None	             [ ] Nausea              [ ] Pruritis        [ ] Weakness          [ ] Numbness        	[ ] Other:    ASSESSMENT/PLAN:    [ ] Continue current therapy    [X] Therapy changed to:    [X] PRN Analgesics   [ ] IV PCA    Comments:
OB Postpartum Note:  Delivery, POD#3    S: 43y POD#3 s/p LTCS. NO acute overnight events. The patient feels well.  Pain is well controlled. She is tolerating a regular diet and passing flatus. She is voiding spontaneously and ambulating without difficulty. Denies CP, SOB, lightheadedness, dizziness. Denies N/V.    O:   Vital Signs Last 24 Hrs  T(C): 36.8 (19 Dec 2018 05:39), Max: 36.8 (19 Dec 2018 05:39)  T(F): 98.2 (19 Dec 2018 05:39), Max: 98.2 (19 Dec 2018 05:39)  HR: 65 (19 Dec 2018 05:39) (65 - 81)  BP: 112/71 (19 Dec 2018 05:39) (105/72 - 116/82)  RR: 18 (19 Dec 2018 05:39) (17 - 18)  SpO2: 100% (18 Dec 2018 21:24) (100% - 100%)    PE:  General: NAD  Abdomen: Mildly distended, appropriately tender, firm uterine fundus  Incision: clean, dry, intact w/ steri strips   Extremities: No erythema, no pitting edema, non-tender    Labs:  Blood Type: AB Positive  Antibody Screen: Negative        RPR: Negative                            9.7    9.90  )-----------( 189      ( 17 Dec 2018 09:18 )             29.4                 Medications:  MEDICATIONS  (STANDING):  acetaminophen   Tablet .. 975 milliGRAM(s) Oral every 6 hours  ascorbic acid 500 milliGRAM(s) Oral two times a day  diphtheria/tetanus/pertussis (acellular) Vaccine (ADAcel) 0.5 milliLiter(s) IntraMuscular once  ferrous    sulfate 325 milliGRAM(s) Oral two times a day  heparin  Injectable 5000 Unit(s) SubCutaneous every 12 hours  ibuprofen  Tablet. 600 milliGRAM(s) Oral every 6 hours  lactated ringers. 1000 milliLiter(s) (125 mL/Hr) IV Continuous <Continuous>  lactated ringers. 1000 milliLiter(s) (125 mL/Hr) IV Continuous <Continuous>  lactated ringers. 1000 milliLiter(s) (75 mL/Hr) IV Continuous <Continuous>  levothyroxine 50 MICROGram(s) Oral daily  oxyCODONE    IR 5 milliGRAM(s) Oral every 3 hours  oxytocin Infusion 333.333 milliUNIT(s)/Min (1000 mL/Hr) IV Continuous <Continuous>  oxytocin Infusion 41.667 milliUNIT(s)/Min (125 mL/Hr) IV Continuous <Continuous>  oxytocin Infusion 41.667 milliUNIT(s)/Min (125 mL/Hr) IV Continuous <Continuous>  prenatal multivitamin 1 Tablet(s) Oral daily    MEDICATIONS  (PRN):  diphenhydrAMINE 25 milliGRAM(s) Oral every 6 hours PRN Itching  docusate sodium 100 milliGRAM(s) Oral two times a day PRN Stool Softening  glycerin Suppository - Adult 1 Suppository(s) Rectal at bedtime PRN Constipation  lanolin Ointment 1 Application(s) Topical every 3 hours PRN Sore Nipples  metoclopramide Injectable 10 milliGRAM(s) IV Push once PRN Nausea and/or Vomiting  oxyCODONE    IR 5 milliGRAM(s) Oral every 4 hours PRN Severe Pain (7 - 10)  simethicone 80 milliGRAM(s) Chew every 4 hours PRN Gas

## 2018-12-28 ENCOUNTER — APPOINTMENT (OUTPATIENT)
Dept: OBGYN | Facility: CLINIC | Age: 43
End: 2018-12-28
Payer: COMMERCIAL

## 2018-12-28 PROCEDURE — 0503F POSTPARTUM CARE VISIT: CPT

## 2019-01-24 ENCOUNTER — APPOINTMENT (OUTPATIENT)
Dept: OBGYN | Facility: CLINIC | Age: 44
End: 2019-01-24
Payer: COMMERCIAL

## 2019-01-24 PROCEDURE — 0503F POSTPARTUM CARE VISIT: CPT

## 2019-04-21 ENCOUNTER — RESULT REVIEW (OUTPATIENT)
Age: 44
End: 2019-04-21

## 2019-04-22 ENCOUNTER — APPOINTMENT (OUTPATIENT)
Dept: OBGYN | Facility: CLINIC | Age: 44
End: 2019-04-22
Payer: COMMERCIAL

## 2019-04-22 PROCEDURE — 99396 PREV VISIT EST AGE 40-64: CPT

## 2020-04-20 ENCOUNTER — APPOINTMENT (OUTPATIENT)
Dept: OBGYN | Facility: CLINIC | Age: 45
End: 2020-04-20

## 2020-06-03 ENCOUNTER — FORM ENCOUNTER (OUTPATIENT)
Age: 45
End: 2020-06-03

## 2020-06-04 ENCOUNTER — APPOINTMENT (OUTPATIENT)
Dept: OBGYN | Facility: CLINIC | Age: 45
End: 2020-06-04
Payer: COMMERCIAL

## 2020-06-04 PROCEDURE — 99213 OFFICE O/P EST LOW 20 MIN: CPT

## 2020-06-04 PROCEDURE — 36415 COLL VENOUS BLD VENIPUNCTURE: CPT

## 2020-06-24 ENCOUNTER — RESULT REVIEW (OUTPATIENT)
Age: 45
End: 2020-06-24

## 2020-06-25 ENCOUNTER — APPOINTMENT (OUTPATIENT)
Dept: OBGYN | Facility: CLINIC | Age: 45
End: 2020-06-25
Payer: COMMERCIAL

## 2020-06-25 PROCEDURE — 99396 PREV VISIT EST AGE 40-64: CPT

## 2020-07-22 ENCOUNTER — FORM ENCOUNTER (OUTPATIENT)
Age: 45
End: 2020-07-22

## 2021-01-12 ENCOUNTER — FORM ENCOUNTER (OUTPATIENT)
Age: 46
End: 2021-01-12

## 2021-01-21 ENCOUNTER — FORM ENCOUNTER (OUTPATIENT)
Age: 46
End: 2021-01-21

## 2021-06-06 ENCOUNTER — FORM ENCOUNTER (OUTPATIENT)
Age: 46
End: 2021-06-06

## 2021-06-06 ENCOUNTER — RESULT REVIEW (OUTPATIENT)
Age: 46
End: 2021-06-06

## 2021-06-07 ENCOUNTER — APPOINTMENT (OUTPATIENT)
Dept: OBGYN | Facility: CLINIC | Age: 46
End: 2021-06-07
Payer: COMMERCIAL

## 2021-06-07 ENCOUNTER — FORM ENCOUNTER (OUTPATIENT)
Age: 46
End: 2021-06-07

## 2021-06-07 PROCEDURE — 99072 ADDL SUPL MATRL&STAF TM PHE: CPT

## 2021-06-07 PROCEDURE — 99396 PREV VISIT EST AGE 40-64: CPT

## 2021-06-09 ENCOUNTER — FORM ENCOUNTER (OUTPATIENT)
Age: 46
End: 2021-06-09

## 2021-06-20 ENCOUNTER — FORM ENCOUNTER (OUTPATIENT)
Age: 46
End: 2021-06-20

## 2021-08-02 ENCOUNTER — FORM ENCOUNTER (OUTPATIENT)
Age: 46
End: 2021-08-02

## 2021-12-01 DIAGNOSIS — R92.2 INCONCLUSIVE MAMMOGRAM: ICD-10-CM

## 2021-12-01 DIAGNOSIS — Z12.39 ENCOUNTER FOR OTHER SCREENING FOR MALIGNANT NEOPLASM OF BREAST: ICD-10-CM

## 2021-12-03 ENCOUNTER — TRANSCRIPTION ENCOUNTER (OUTPATIENT)
Age: 46
End: 2021-12-03

## 2022-01-07 ENCOUNTER — NON-APPOINTMENT (OUTPATIENT)
Age: 47
End: 2022-01-07

## 2022-01-07 DIAGNOSIS — Z78.9 OTHER SPECIFIED HEALTH STATUS: ICD-10-CM

## 2022-01-07 DIAGNOSIS — F90.9 ATTENTION-DEFICIT HYPERACTIVITY DISORDER, UNSPECIFIED TYPE: ICD-10-CM

## 2022-01-07 DIAGNOSIS — Z98.890 OTHER SPECIFIED POSTPROCEDURAL STATES: ICD-10-CM

## 2022-01-07 DIAGNOSIS — Z92.89 PERSONAL HISTORY OF OTHER MEDICAL TREATMENT: ICD-10-CM

## 2022-01-07 DIAGNOSIS — F41.9 ANXIETY DISORDER, UNSPECIFIED: ICD-10-CM

## 2022-02-11 ENCOUNTER — RESULT REVIEW (OUTPATIENT)
Age: 47
End: 2022-02-11

## 2022-02-11 ENCOUNTER — APPOINTMENT (OUTPATIENT)
Dept: MAMMOGRAPHY | Facility: CLINIC | Age: 47
End: 2022-02-11
Payer: COMMERCIAL

## 2022-02-11 ENCOUNTER — APPOINTMENT (OUTPATIENT)
Dept: ULTRASOUND IMAGING | Facility: CLINIC | Age: 47
End: 2022-02-11
Payer: COMMERCIAL

## 2022-02-11 PROCEDURE — 77067 SCR MAMMO BI INCL CAD: CPT

## 2022-02-11 PROCEDURE — 77063 BREAST TOMOSYNTHESIS BI: CPT

## 2022-02-11 PROCEDURE — 76641 ULTRASOUND BREAST COMPLETE: CPT | Mod: 50

## 2022-02-14 DIAGNOSIS — N63.20 UNSPECIFIED LUMP IN THE LEFT BREAST, UNSPECIFIED QUADRANT: ICD-10-CM

## 2022-02-16 ENCOUNTER — RESULT REVIEW (OUTPATIENT)
Age: 47
End: 2022-02-16

## 2022-02-16 ENCOUNTER — APPOINTMENT (OUTPATIENT)
Dept: ULTRASOUND IMAGING | Facility: CLINIC | Age: 47
End: 2022-02-16
Payer: COMMERCIAL

## 2022-02-16 PROCEDURE — 76642 ULTRASOUND BREAST LIMITED: CPT | Mod: LT

## 2022-06-09 ENCOUNTER — RESULT REVIEW (OUTPATIENT)
Age: 47
End: 2022-06-09

## 2022-06-09 ENCOUNTER — APPOINTMENT (OUTPATIENT)
Dept: OBGYN | Facility: CLINIC | Age: 47
End: 2022-06-09
Payer: COMMERCIAL

## 2022-06-09 VITALS
DIASTOLIC BLOOD PRESSURE: 65 MMHG | HEIGHT: 59 IN | BODY MASS INDEX: 22.38 KG/M2 | WEIGHT: 111 LBS | SYSTOLIC BLOOD PRESSURE: 95 MMHG

## 2022-06-09 DIAGNOSIS — N63.0 UNSPECIFIED LUMP IN UNSPECIFIED BREAST: ICD-10-CM

## 2022-06-09 DIAGNOSIS — Z01.419 ENCOUNTER FOR GYNECOLOGICAL EXAMINATION (GENERAL) (ROUTINE) W/OUT ABNORMAL FINDINGS: ICD-10-CM

## 2022-06-09 DIAGNOSIS — Z11.51 ENCOUNTER FOR SCREENING FOR HUMAN PAPILLOMAVIRUS (HPV): ICD-10-CM

## 2022-06-09 PROCEDURE — 99396 PREV VISIT EST AGE 40-64: CPT

## 2022-06-09 NOTE — PLAN
[FreeTextEntry1] : 46 yo presents for an annual exam. \par -pap smear done\par -rx mammo and sono\par -Colonoscopy advised\par -RTO next annual

## 2022-06-09 NOTE — PHYSICAL EXAM
[Chaperone Present] : A chaperone was present in the examining room during all aspects of the physical examination [Appropriately responsive] : appropriately responsive [Alert] : alert [No Acute Distress] : no acute distress [No Lymphadenopathy] : no lymphadenopathy [Soft] : soft [Non-tender] : non-tender [Non-distended] : non-distended [No HSM] : No HSM [No Lesions] : no lesions [No Mass] : no mass [Oriented x3] : oriented x3 [Examination Of The Breasts] : a normal appearance [No Masses] : no breast masses were palpable [Labia Majora] : normal [Labia Minora] : normal [Normal] : normal [Uterine Adnexae] : normal [FreeTextEntry1] : Barrie

## 2022-06-10 NOTE — ED ADULT NURSE NOTE - DISCHARGE DATE/TIME
Peripheral Block      Patient location during procedure: OR  Start time: 6/10/2022 4:20 PM  Stop time: 6/10/2022 4:39 PM  Reason for block: procedure for pain and post-op pain management  Preanesthetic Checklist  Completed: patient identified, IV checked, site marked, risks and benefits discussed, surgical consent, monitors and equipment checked, pre-op evaluation and timeout performed  Prep:  Pt Position: supine  Sterile barriers:cap, gloves, mask and washed/disinfected hands  Prep: ChloraPrep  Patient monitoring: blood pressure monitoring, continuous pulse oximetry and EKG  Procedure    Sedation: yes  Performed under: PNB  Guidance:ultrasound guided and landmark technique    ULTRASOUND INTERPRETATION. Using ultrasound guidance a 21 G gauge needle was placed in close proximity to the nerve, at which point, under ultrasound guidance anesthetic was injected in the area of the nerve and spread of the anesthesia was seen on ultrasound in close proximity thereto.  There were no abnormalities seen on ultrasound; a digital image was taken; and the patient tolerated the procedure with no complications. Images:still images obtained, printed/placed on chart    Laterality:right  Block Type:supraclavicular  Injection Technique:single-shot  Needle Type:echogenic  Needle Gauge:22 G  Resistance on Injection: none    Medications Used: ropivacaine (NAROPIN) 0.5 % injection, 20 mL  Med administered at 6/10/2022 4:34 PM      Medications  Comment:Needle visualized throughout. Pt tolerated well.    Post Assessment  Injection Assessment: negative aspiration for heme, no paresthesia on injection and incremental injection  Patient Tolerance:comfortable throughout block  Complications:no             24-Dec-2017 11:37

## 2022-06-24 LAB
CYTOLOGY CVX/VAG DOC THIN PREP: NORMAL
HPV HIGH+LOW RISK DNA PNL CVX: NOT DETECTED

## 2022-07-26 DIAGNOSIS — B37.9 CANDIDIASIS, UNSPECIFIED: ICD-10-CM

## 2022-07-26 RX ORDER — FLUCONAZOLE 150 MG/1
150 TABLET ORAL
Qty: 2 | Refills: 0 | Status: ACTIVE | COMMUNITY
Start: 2022-07-26 | End: 1900-01-01

## 2022-08-17 ENCOUNTER — APPOINTMENT (OUTPATIENT)
Dept: ULTRASOUND IMAGING | Facility: CLINIC | Age: 47
End: 2022-08-17

## 2022-08-17 ENCOUNTER — RESULT REVIEW (OUTPATIENT)
Age: 47
End: 2022-08-17

## 2022-08-17 PROCEDURE — 76642 ULTRASOUND BREAST LIMITED: CPT | Mod: LT

## 2022-09-09 RX ORDER — FLUCONAZOLE 150 MG/1
150 TABLET ORAL
Qty: 2 | Refills: 0 | Status: ACTIVE | COMMUNITY
Start: 2022-09-09 | End: 1900-01-01

## 2022-09-09 RX ORDER — CLOTRIMAZOLE AND BETAMETHASONE DIPROPIONATE 10; .5 MG/G; MG/G
1-0.05 CREAM TOPICAL 3 TIMES DAILY
Qty: 1 | Refills: 0 | Status: ACTIVE | COMMUNITY
Start: 2022-09-09 | End: 1900-01-01

## 2022-10-13 ENCOUNTER — APPOINTMENT (OUTPATIENT)
Dept: OBGYN | Facility: CLINIC | Age: 47
End: 2022-10-13

## 2022-10-13 VITALS
SYSTOLIC BLOOD PRESSURE: 91 MMHG | BODY MASS INDEX: 22.78 KG/M2 | HEIGHT: 59 IN | DIASTOLIC BLOOD PRESSURE: 63 MMHG | WEIGHT: 113 LBS

## 2022-10-13 DIAGNOSIS — N76.0 ACUTE VAGINITIS: ICD-10-CM

## 2022-10-13 PROCEDURE — 99213 OFFICE O/P EST LOW 20 MIN: CPT

## 2022-10-13 RX ORDER — CLOTRIMAZOLE AND BETAMETHASONE DIPROPIONATE 10; .5 MG/G; MG/G
1-0.05 CREAM TOPICAL 3 TIMES DAILY
Qty: 1 | Refills: 0 | Status: ACTIVE | COMMUNITY
Start: 2022-10-13 | End: 1900-01-01

## 2022-10-13 NOTE — PHYSICAL EXAM
[Appropriately responsive] : appropriately responsive [Alert] : alert [No Acute Distress] : no acute distress [Soft] : soft [Non-tender] : non-tender [Non-distended] : non-distended [No HSM] : No HSM [No Lesions] : no lesions [No Mass] : no mass [Oriented x3] : oriented x3 [Labia Majora] : normal [Labia Minora] : normal [Discharge] : a  ~M vaginal discharge was present [Scant] : scant [White] : white [Normal] : normal [Uterine Adnexae] : normal [Chaperone Declined] : Patient declined chaperone [No Bleeding] : There was no active vaginal bleeding [FreeTextEntry4] : no erythema, lesion, bleeding

## 2022-10-13 NOTE — PLAN
[FreeTextEntry1] : 46 yo  LMP 22 presents for external vaginal irritation and UTI sxs.\par \par External vaginal irritation\par -Bd affirm done today\par -rx Lotrisone topical 2-3x/day for itching\par -Strategies to decrease vaginitis symptoms and recurrence discussed. Wear cotton underwear, loose fitting closes and avoid panty hose.  Avoid hot tubs, spas and douching as well as hygiene sprays, fragrances and powders in the genital area.  Use pads instead of tampons while you have an infection.  Appropriate front to back cleaning after using the toilet.\par \par UTI sxs\par -urine for cx collected today\par -advised to Increase PO fluids\par -advised daily probiotics\par \par During this visit 20 minutes was spent face-to-face with greater than 50% of the time dedicated to counseling.\par

## 2022-10-13 NOTE — REVIEW OF SYSTEMS
[Negative] : Heme/Lymph [Patient Intake Form Reviewed] : Patient intake form was reviewed [Urgency] : no urgency [Dysuria] : no dysuria [Abn Vaginal bleeding] : no abnormal vaginal bleeding [Genital Rash/Irritation] : genital rash/irritation [FreeTextEntry8] : vaginal itching

## 2022-10-13 NOTE — COUNSELING
[Vitamins/Supplements] : vitamins/supplements [Breast Self Exam] : breast self exam [Bladder Hygiene] : bladder hygiene [Confidentiality] : confidentiality [Lab Results] : lab results [Medication Management] : medication management

## 2022-10-13 NOTE — END OF VISIT
[FreeTextEntry3] : I, Mason Spears, acted as a scribe on behalf of Savana Junior NP, on 10/13/2022.\par \par All medical entries made by the scribe where at my, Savana Junior'sKANWAL, direction and personally dictated by me on 10/13/2022. I have reviewed the chart and agree that the record accurately reflects my personal performance of the history, physical exam, assessment, and plan. I have also personally directed, reviewed and agreed with the chart.

## 2022-10-13 NOTE — HISTORY OF PRESENT ILLNESS
[FreeTextEntry1] : 48 yo  LMP 22 presents for external vaginal irritation. Pt denies abnml bleeding and discharge. Also reports "UTI sxs"; occasional lower pelvic discomfort and possible increased urinary frequency; Was out of town in [place] this past weekend for the [employer] (her  ran) and does not know if her frequency was from anxiety or something else.  but denies acute urinary sxs, denies frequency and incomplete emptying.\par Used OTC Monistat 1 with some relief but still having external itching. \par \par OB: mab \par GYN: IVF, sexually active, single\par PMHx: ADHD\par PSHx: D+C/mab 10/23/2017\par Meds: Synthroid\par  [TextBox_4] : Pt c/o vaginal burning and pelvic soreness  [Mammogramdate] : 02/22 [TextBox_19] : BIRADS 0: incomplete, needs additional imaging evaluation [BreastSonogramDate] : 08/22 [TextBox_25] : BIRADS 3, left limited sono [PapSmeardate] : 6/2022 [LMPDate] : 9/29/2022

## 2022-10-14 LAB
CANDIDA VAG CYTO: NOT DETECTED
G VAGINALIS+PREV SP MTYP VAG QL MICRO: NOT DETECTED
T VAGINALIS VAG QL WET PREP: NOT DETECTED

## 2022-10-17 LAB — BACTERIA UR CULT: NORMAL

## 2022-12-01 DIAGNOSIS — Z80.3 FAMILY HISTORY OF MALIGNANT NEOPLASM OF BREAST: ICD-10-CM

## 2022-12-01 DIAGNOSIS — Z12.39 ENCOUNTER FOR OTHER SCREENING FOR MALIGNANT NEOPLASM OF BREAST: ICD-10-CM

## 2023-01-19 NOTE — ED ADULT TRIAGE NOTE - NS ED NURSE DIRECT TO ROOM YN
Breast Nurse Care Coordination:      I introduced self to patient and explained my role of breast nurse coordinator. I accompanied Krupa to her surgical consultation on 1/19/2023 with Dr. Calvillo at the [facility].       Krupa was given the new breast cancer patient packet which includes educational material and support resources such as [employer], Fighting Cancer through Diet and Lifestyle, [employer], [employer], [employer] and the [facility] Breast Cancer Support Group.  I also enclosed a copy of her right breast biopsy pathology report (1/11/2023).       At the end of the consultation, we reviewed Krupa's plan of care and education.  The plan is for patient to meet with Medical Oncology- she is hoping we can get her pre-op done at this time. She is adamant about not getting a pre-op done, as she just had an annual physical in December. I will consult with the Medical Oncologist, once she has been scheduled to see if this can be accomadated. Krupa would like to proceed with a right breast lumpectomy and is scheduled on 2/15/2023.     I gave patient [employer] educational materials regarding Exercises After Breast Surgery and Lumpectomy.  Informed patient for lumpectomy surgery, she will want to have two good supportive sports bras that open in the front to wear the 2 weeks following her surgery. I gave patient information on different options to purchase sports bras.      I answered her questions and encouraged patient to call me back with any future questions or concerns.  Krupa has my contact information, and knows to contact me in the future with any questions or concerns.     Lamar Hutton 1/19/2023 3:37 PM    Lamar Hutton, RN, BSN, PHN  Breast Care Nurse Coordinator  [facility] [place]  [phone]  
No

## 2023-02-17 ENCOUNTER — RESULT REVIEW (OUTPATIENT)
Age: 48
End: 2023-02-17

## 2023-02-17 ENCOUNTER — APPOINTMENT (OUTPATIENT)
Dept: MAMMOGRAPHY | Facility: CLINIC | Age: 48
End: 2023-02-17
Payer: COMMERCIAL

## 2023-02-17 ENCOUNTER — APPOINTMENT (OUTPATIENT)
Dept: ULTRASOUND IMAGING | Facility: CLINIC | Age: 48
End: 2023-02-17
Payer: COMMERCIAL

## 2023-02-17 PROCEDURE — 76641 ULTRASOUND BREAST COMPLETE: CPT | Mod: 50

## 2023-02-17 PROCEDURE — 77063 BREAST TOMOSYNTHESIS BI: CPT | Mod: 59

## 2023-02-17 PROCEDURE — 77065 DX MAMMO INCL CAD UNI: CPT | Mod: LT,GG

## 2023-02-17 PROCEDURE — 77067 SCR MAMMO BI INCL CAD: CPT | Mod: 59

## 2023-04-12 NOTE — DISCHARGE NOTE OB - MEDICATION SUMMARY - MEDICATIONS TO STOP TAKING
CONRAD Holding LOYDA CALDERON I will STOP taking the medications listed below when I get home from the hospital:    Aspir 81 oral delayed release tablet  -- 1 tab(s) by mouth once a day

## 2023-05-31 ENCOUNTER — APPOINTMENT (OUTPATIENT)
Dept: OPHTHALMOLOGY | Facility: CLINIC | Age: 48
End: 2023-05-31
Payer: COMMERCIAL

## 2023-05-31 ENCOUNTER — NON-APPOINTMENT (OUTPATIENT)
Age: 48
End: 2023-05-31

## 2023-05-31 PROCEDURE — 92004 COMPRE OPH EXAM NEW PT 1/>: CPT

## 2023-06-15 ENCOUNTER — APPOINTMENT (OUTPATIENT)
Dept: OBGYN | Facility: CLINIC | Age: 48
End: 2023-06-15
Payer: COMMERCIAL

## 2023-06-15 VITALS
DIASTOLIC BLOOD PRESSURE: 62 MMHG | HEIGHT: 59 IN | BODY MASS INDEX: 24.19 KG/M2 | WEIGHT: 120 LBS | SYSTOLIC BLOOD PRESSURE: 98 MMHG

## 2023-06-15 PROCEDURE — 0502F SUBSEQUENT PRENATAL CARE: CPT

## 2023-06-15 NOTE — PLAN
[FreeTextEntry1] : 47 yo female pt present for an annual wellness exam\par \par HCM\par -pap done today \par -breast mammo/ sono up to date\par -rx for breast MRI: Keith lifetime Risk: 34.1%\par -colonoscopy up to date\par -rto 1 year

## 2023-06-15 NOTE — HISTORY OF PRESENT ILLNESS
[Patient reported mammogram was normal] : Patient reported mammogram was normal [Patient reported breast sonogram was normal] : Patient reported breast sonogram was normal [Patient reported PAP Smear was normal] : Patient reported PAP Smear was normal [FreeTextEntry1] : 49 yo female pt present for an annual wellness exam. The pt is well and has no complaints. \par  [Mammogramdate] : 02/23 [TextBox_19] : birads 3 [BreastSonogramDate] : 02/23 [PapSmeardate] : 06/22

## 2023-06-25 LAB
CYTOLOGY CVX/VAG DOC THIN PREP: NORMAL
HPV HIGH+LOW RISK DNA PNL CVX: NOT DETECTED

## 2023-07-26 DIAGNOSIS — Z91.89 OTHER SPECIFIED PERSONAL RISK FACTORS, NOT ELSEWHERE CLASSIFIED: ICD-10-CM

## 2023-07-27 ENCOUNTER — RESULT REVIEW (OUTPATIENT)
Age: 48
End: 2023-07-27

## 2023-07-27 ENCOUNTER — OUTPATIENT (OUTPATIENT)
Dept: OUTPATIENT SERVICES | Facility: HOSPITAL | Age: 48
LOS: 1 days | End: 2023-07-27
Payer: COMMERCIAL

## 2023-07-27 ENCOUNTER — APPOINTMENT (OUTPATIENT)
Dept: MRI IMAGING | Facility: IMAGING CENTER | Age: 48
End: 2023-07-27
Payer: COMMERCIAL

## 2023-07-27 DIAGNOSIS — Z91.89 OTHER SPECIFIED PERSONAL RISK FACTORS, NOT ELSEWHERE CLASSIFIED: ICD-10-CM

## 2023-07-27 PROCEDURE — 77049 MRI BREAST C-+ W/CAD BI: CPT | Mod: 26

## 2023-07-27 PROCEDURE — C8908: CPT

## 2023-07-27 PROCEDURE — A9585: CPT

## 2023-07-27 PROCEDURE — C8937: CPT

## 2023-10-24 NOTE — DISCHARGE NOTE OB - BREAST MILK SUPPORTS STABLE NEWBORN BLOOD SUGAR
Statement Selected
PAST MEDICAL HISTORY:  GERD (gastroesophageal reflux disease)     HTN (hypertension)     Prediabetes     Thyroid nodule     Unilateral primary osteoarthritis, left knee

## 2024-03-01 ENCOUNTER — APPOINTMENT (OUTPATIENT)
Dept: ULTRASOUND IMAGING | Facility: CLINIC | Age: 49
End: 2024-03-01
Payer: COMMERCIAL

## 2024-03-01 ENCOUNTER — RESULT REVIEW (OUTPATIENT)
Age: 49
End: 2024-03-01

## 2024-03-01 ENCOUNTER — APPOINTMENT (OUTPATIENT)
Dept: MAMMOGRAPHY | Facility: CLINIC | Age: 49
End: 2024-03-01
Payer: COMMERCIAL

## 2024-03-01 PROCEDURE — 76641 ULTRASOUND BREAST COMPLETE: CPT | Mod: 50

## 2024-03-01 PROCEDURE — 77063 BREAST TOMOSYNTHESIS BI: CPT | Mod: 59

## 2024-03-01 PROCEDURE — 77067 SCR MAMMO BI INCL CAD: CPT | Mod: 59

## 2024-03-01 PROCEDURE — 77065 DX MAMMO INCL CAD UNI: CPT | Mod: GG,RT

## 2024-03-07 DIAGNOSIS — R92.8 OTHER ABNORMAL AND INCONCLUSIVE FINDINGS ON DIAGNOSTIC IMAGING OF BREAST: ICD-10-CM

## 2024-07-31 ENCOUNTER — TRANSCRIPTION ENCOUNTER (OUTPATIENT)
Age: 49
End: 2024-07-31

## 2024-09-06 ENCOUNTER — APPOINTMENT (OUTPATIENT)
Dept: MAMMOGRAPHY | Facility: CLINIC | Age: 49
End: 2024-09-06
Payer: COMMERCIAL

## 2024-09-06 ENCOUNTER — RESULT REVIEW (OUTPATIENT)
Age: 49
End: 2024-09-06

## 2024-09-06 PROCEDURE — 77065 DX MAMMO INCL CAD UNI: CPT | Mod: RT

## 2024-09-06 PROCEDURE — 77061 BREAST TOMOSYNTHESIS UNI: CPT | Mod: RT

## 2024-09-06 PROCEDURE — G0279: CPT | Mod: RT

## 2024-09-19 ENCOUNTER — APPOINTMENT (OUTPATIENT)
Dept: OBGYN | Facility: CLINIC | Age: 49
End: 2024-09-19
Payer: COMMERCIAL

## 2024-09-19 VITALS
WEIGHT: 115.9 LBS | HEART RATE: 67 BPM | SYSTOLIC BLOOD PRESSURE: 103 MMHG | BODY MASS INDEX: 23.36 KG/M2 | DIASTOLIC BLOOD PRESSURE: 71 MMHG | HEIGHT: 59 IN

## 2024-09-19 DIAGNOSIS — N92.6 IRREGULAR MENSTRUATION, UNSPECIFIED: ICD-10-CM

## 2024-09-19 PROCEDURE — 99396 PREV VISIT EST AGE 40-64: CPT

## 2024-09-20 ENCOUNTER — TRANSCRIPTION ENCOUNTER (OUTPATIENT)
Age: 49
End: 2024-09-20

## 2024-09-20 LAB
ESTRADIOL SERPL-MCNC: 334 PG/ML
FSH SERPL-MCNC: 22 IU/L
HPV HIGH+LOW RISK DNA PNL CVX: NOT DETECTED
LH SERPL-ACNC: 12.6 IU/L
TSH SERPL-ACNC: 2.48 UIU/ML

## 2024-09-24 LAB — CYTOLOGY CVX/VAG DOC THIN PREP: NORMAL

## 2024-09-25 NOTE — PLAN
[FreeTextEntry1] : 48 yo for annual visit, c/o irregular cycles.  HCM  -pap smear today -rx mammo and sono -colonoscopy advised -rto 1 year  Irregular cycles -FSH, LH, TSH, Estradiol done today

## 2024-09-25 NOTE — PHYSICAL EXAM
[Chaperone Present] : A chaperone was present in the examining room during all aspects of the physical examination [Appropriately responsive] : appropriately responsive [Alert] : alert [No Acute Distress] : no acute distress [No Lymphadenopathy] : no lymphadenopathy [Soft] : soft [Non-tender] : non-tender [Non-distended] : non-distended [No HSM] : No HSM [No Lesions] : no lesions [No Mass] : no mass [Oriented x3] : oriented x3 [Examination Of The Breasts] : a normal appearance [No Masses] : no breast masses were palpable [Labia Majora] : normal [Labia Minora] : normal [Normal] : normal [Uterine Adnexae] : normal [FreeTextEntry2] : Leon

## 2024-09-25 NOTE — END OF VISIT
[FreeTextEntry3] : I Paulo Huang, acted as a scribe on behalf of Dr. Guy Simpson on 09/19/2024.  All medical entries made by this scribe where at my Dr. Guy Simpson, direction and personally dictated by me on 09/19/2024.  I have reviewed the chart and agree that the record accurately reflects my personal performance of the history, physical exam, assessment, and plan. I have also personally directed, reviewed and agreed with the chart.
